# Patient Record
Sex: FEMALE | Race: WHITE | NOT HISPANIC OR LATINO | Employment: UNEMPLOYED
[De-identification: names, ages, dates, MRNs, and addresses within clinical notes are randomized per-mention and may not be internally consistent; named-entity substitution may affect disease eponyms.]

---

## 2017-10-01 ENCOUNTER — SURGERY (OUTPATIENT)
Age: 72
End: 2017-10-01

## 2017-10-01 ENCOUNTER — HOSPITAL ENCOUNTER (OUTPATIENT)
Facility: CLINIC | Age: 72
Setting detail: OBSERVATION
Discharge: HOME | End: 2017-10-01
Attending: EMERGENCY MEDICINE | Admitting: INTERNAL MEDICINE
Payer: COMMERCIAL

## 2017-10-01 ENCOUNTER — ANESTHESIA EVENT (OUTPATIENT)
Dept: SURGERY | Facility: CLINIC | Age: 72
End: 2017-10-01
Payer: COMMERCIAL

## 2017-10-01 ENCOUNTER — APPOINTMENT (OUTPATIENT)
Dept: GENERAL RADIOLOGY | Facility: CLINIC | Age: 72
End: 2017-10-01
Attending: INTERNAL MEDICINE
Payer: COMMERCIAL

## 2017-10-01 ENCOUNTER — APPOINTMENT (OUTPATIENT)
Dept: CT IMAGING | Facility: CLINIC | Age: 72
End: 2017-10-01
Attending: EMERGENCY MEDICINE
Payer: COMMERCIAL

## 2017-10-01 ENCOUNTER — ANESTHESIA (OUTPATIENT)
Dept: SURGERY | Facility: CLINIC | Age: 72
End: 2017-10-01
Payer: COMMERCIAL

## 2017-10-01 VITALS
WEIGHT: 154.32 LBS | TEMPERATURE: 97.3 F | HEIGHT: 61 IN | DIASTOLIC BLOOD PRESSURE: 42 MMHG | OXYGEN SATURATION: 96 % | BODY MASS INDEX: 29.14 KG/M2 | RESPIRATION RATE: 19 BRPM | SYSTOLIC BLOOD PRESSURE: 110 MMHG | HEART RATE: 47 BPM

## 2017-10-01 DIAGNOSIS — N20.0 KIDNEY STONE: ICD-10-CM

## 2017-10-01 LAB
ALBUMIN SERPL-MCNC: 4 G/DL (ref 3.4–5)
ALBUMIN UR-MCNC: NEGATIVE MG/DL
ALP SERPL-CCNC: 66 U/L (ref 40–150)
ALT SERPL W P-5'-P-CCNC: 31 U/L (ref 0–50)
ANION GAP SERPL CALCULATED.3IONS-SCNC: 7 MMOL/L (ref 3–14)
APPEARANCE UR: ABNORMAL
AST SERPL W P-5'-P-CCNC: 22 U/L (ref 0–45)
BASOPHILS # BLD AUTO: 0 10E9/L (ref 0–0.2)
BASOPHILS NFR BLD AUTO: 0.2 %
BILIRUB SERPL-MCNC: 0.8 MG/DL (ref 0.2–1.3)
BILIRUB UR QL STRIP: NEGATIVE
BUN SERPL-MCNC: 24 MG/DL (ref 7–30)
CALCIUM SERPL-MCNC: 9.3 MG/DL (ref 8.5–10.1)
CHLORIDE SERPL-SCNC: 108 MMOL/L (ref 94–109)
CO2 SERPL-SCNC: 26 MMOL/L (ref 20–32)
COLOR UR AUTO: YELLOW
CREAT SERPL-MCNC: 1 MG/DL (ref 0.52–1.04)
DIFFERENTIAL METHOD BLD: ABNORMAL
EOSINOPHIL # BLD AUTO: 0 10E9/L (ref 0–0.7)
EOSINOPHIL NFR BLD AUTO: 0.1 %
ERYTHROCYTE [DISTWIDTH] IN BLOOD BY AUTOMATED COUNT: 13.3 % (ref 10–15)
GFR SERPL CREATININE-BSD FRML MDRD: 55 ML/MIN/1.7M2
GLUCOSE SERPL-MCNC: 121 MG/DL (ref 70–99)
GLUCOSE UR STRIP-MCNC: NEGATIVE MG/DL
HCT VFR BLD AUTO: 39.7 % (ref 35–47)
HGB BLD-MCNC: 13.7 G/DL (ref 11.7–15.7)
HGB UR QL STRIP: ABNORMAL
IMM GRANULOCYTES # BLD: 0 10E9/L (ref 0–0.4)
IMM GRANULOCYTES NFR BLD: 0.4 %
KETONES UR STRIP-MCNC: 20 MG/DL
LEUKOCYTE ESTERASE UR QL STRIP: NEGATIVE
LIPASE SERPL-CCNC: 119 U/L (ref 73–393)
LYMPHOCYTES # BLD AUTO: 0.6 10E9/L (ref 0.8–5.3)
LYMPHOCYTES NFR BLD AUTO: 7.4 %
MCH RBC QN AUTO: 32.2 PG (ref 26.5–33)
MCHC RBC AUTO-ENTMCNC: 34.5 G/DL (ref 31.5–36.5)
MCV RBC AUTO: 93 FL (ref 78–100)
MONOCYTES # BLD AUTO: 0.4 10E9/L (ref 0–1.3)
MONOCYTES NFR BLD AUTO: 5 %
MUCOUS THREADS #/AREA URNS LPF: PRESENT /LPF
NEUTROPHILS # BLD AUTO: 7.1 10E9/L (ref 1.6–8.3)
NEUTROPHILS NFR BLD AUTO: 86.9 %
NITRATE UR QL: NEGATIVE
NRBC # BLD AUTO: 0 10*3/UL
NRBC BLD AUTO-RTO: 0 /100
PH UR STRIP: 5 PH (ref 5–7)
PLATELET # BLD AUTO: 288 10E9/L (ref 150–450)
POTASSIUM SERPL-SCNC: 3.8 MMOL/L (ref 3.4–5.3)
PROT SERPL-MCNC: 7.6 G/DL (ref 6.8–8.8)
RBC # BLD AUTO: 4.26 10E12/L (ref 3.8–5.2)
RBC #/AREA URNS AUTO: 17 /HPF (ref 0–2)
SODIUM SERPL-SCNC: 141 MMOL/L (ref 133–144)
SOURCE: ABNORMAL
SP GR UR STRIP: 1.02 (ref 1–1.03)
SQUAMOUS #/AREA URNS AUTO: <1 /HPF (ref 0–1)
TROPONIN I SERPL-MCNC: <0.015 UG/L (ref 0–0.04)
UROBILINOGEN UR STRIP-MCNC: 0 MG/DL (ref 0–2)
WBC # BLD AUTO: 8.2 10E9/L (ref 4–11)
WBC #/AREA URNS AUTO: 3 /HPF (ref 0–2)

## 2017-10-01 PROCEDURE — 74177 CT ABD & PELVIS W/CONTRAST: CPT

## 2017-10-01 PROCEDURE — 25000125 ZZHC RX 250: Performed by: NURSE ANESTHETIST, CERTIFIED REGISTERED

## 2017-10-01 PROCEDURE — C1758 CATHETER, URETERAL: HCPCS | Performed by: UROLOGY

## 2017-10-01 PROCEDURE — 40000277 XR SURGERY CARM FLUORO LESS THAN 5 MIN W STILLS: Mod: TC

## 2017-10-01 PROCEDURE — 96376 TX/PRO/DX INJ SAME DRUG ADON: CPT

## 2017-10-01 PROCEDURE — 81001 URINALYSIS AUTO W/SCOPE: CPT | Performed by: EMERGENCY MEDICINE

## 2017-10-01 PROCEDURE — 37000009 ZZH ANESTHESIA TECHNICAL FEE, EACH ADDTL 15 MIN: Performed by: UROLOGY

## 2017-10-01 PROCEDURE — 93005 ELECTROCARDIOGRAM TRACING: CPT

## 2017-10-01 PROCEDURE — 40000065 ZZH STATISTIC EKG NON-CHARGEABLE

## 2017-10-01 PROCEDURE — 25000128 H RX IP 250 OP 636: Performed by: UROLOGY

## 2017-10-01 PROCEDURE — 36000058 ZZH SURGERY LEVEL 3 EA 15 ADDTL MIN: Performed by: UROLOGY

## 2017-10-01 PROCEDURE — 96374 THER/PROPH/DIAG INJ IV PUSH: CPT | Mod: 59

## 2017-10-01 PROCEDURE — 25000566 ZZH SEVOFLURANE, EA 15 MIN: Performed by: UROLOGY

## 2017-10-01 PROCEDURE — 27210794 ZZH OR GENERAL SUPPLY STERILE: Performed by: UROLOGY

## 2017-10-01 PROCEDURE — 99222 1ST HOSP IP/OBS MODERATE 55: CPT | Mod: 25 | Performed by: UROLOGY

## 2017-10-01 PROCEDURE — 25000128 H RX IP 250 OP 636: Performed by: EMERGENCY MEDICINE

## 2017-10-01 PROCEDURE — 96361 HYDRATE IV INFUSION ADD-ON: CPT

## 2017-10-01 PROCEDURE — 71000012 ZZH RECOVERY PHASE 1 LEVEL 1 FIRST HR: Performed by: UROLOGY

## 2017-10-01 PROCEDURE — 71000027 ZZH RECOVERY PHASE 2 EACH 15 MINS: Performed by: UROLOGY

## 2017-10-01 PROCEDURE — 96375 TX/PRO/DX INJ NEW DRUG ADDON: CPT

## 2017-10-01 PROCEDURE — 37000008 ZZH ANESTHESIA TECHNICAL FEE, 1ST 30 MIN: Performed by: UROLOGY

## 2017-10-01 PROCEDURE — 52356 CYSTO/URETERO W/LITHOTRIPSY: CPT | Performed by: UROLOGY

## 2017-10-01 PROCEDURE — C1769 GUIDE WIRE: HCPCS | Performed by: UROLOGY

## 2017-10-01 PROCEDURE — 40000306 ZZH STATISTIC PRE PROC ASSESS II: Performed by: UROLOGY

## 2017-10-01 PROCEDURE — 99285 EMERGENCY DEPT VISIT HI MDM: CPT | Mod: 25

## 2017-10-01 PROCEDURE — C2617 STENT, NON-COR, TEM W/O DEL: HCPCS | Performed by: UROLOGY

## 2017-10-01 PROCEDURE — 99220 ZZC INITIAL OBSERVATION CARE,LEVL III: CPT | Performed by: INTERNAL MEDICINE

## 2017-10-01 PROCEDURE — 83690 ASSAY OF LIPASE: CPT | Performed by: EMERGENCY MEDICINE

## 2017-10-01 PROCEDURE — 25000128 H RX IP 250 OP 636: Performed by: INTERNAL MEDICINE

## 2017-10-01 PROCEDURE — 36000060 ZZH SURGERY LEVEL 3 W FLUORO 1ST 30 MIN: Performed by: UROLOGY

## 2017-10-01 PROCEDURE — 25000132 ZZH RX MED GY IP 250 OP 250 PS 637: Performed by: EMERGENCY MEDICINE

## 2017-10-01 PROCEDURE — 82365 CALCULUS SPECTROSCOPY: CPT | Performed by: UROLOGY

## 2017-10-01 PROCEDURE — 88300 SURGICAL PATH GROSS: CPT | Performed by: UROLOGY

## 2017-10-01 PROCEDURE — 88300 SURGICAL PATH GROSS: CPT | Mod: 26 | Performed by: UROLOGY

## 2017-10-01 PROCEDURE — 80053 COMPREHEN METABOLIC PANEL: CPT | Performed by: EMERGENCY MEDICINE

## 2017-10-01 PROCEDURE — 85025 COMPLETE CBC W/AUTO DIFF WBC: CPT | Performed by: EMERGENCY MEDICINE

## 2017-10-01 PROCEDURE — 25000128 H RX IP 250 OP 636: Performed by: NURSE ANESTHETIST, CERTIFIED REGISTERED

## 2017-10-01 PROCEDURE — 25800025 ZZH RX 258: Performed by: UROLOGY

## 2017-10-01 PROCEDURE — 84484 ASSAY OF TROPONIN QUANT: CPT | Performed by: EMERGENCY MEDICINE

## 2017-10-01 DEVICE — STENT URETERAL DBL PIGTAIL INLAY 6FRX24CM 778624: Type: IMPLANTABLE DEVICE | Site: URETER | Status: FUNCTIONAL

## 2017-10-01 RX ORDER — LABETALOL HYDROCHLORIDE 5 MG/ML
10 INJECTION, SOLUTION INTRAVENOUS
Status: DISCONTINUED | OUTPATIENT
Start: 2017-10-01 | End: 2017-10-01 | Stop reason: HOSPADM

## 2017-10-01 RX ORDER — ALBUTEROL SULFATE 0.83 MG/ML
2.5 SOLUTION RESPIRATORY (INHALATION) EVERY 4 HOURS PRN
Status: DISCONTINUED | OUTPATIENT
Start: 2017-10-01 | End: 2017-10-01 | Stop reason: HOSPADM

## 2017-10-01 RX ORDER — DEXAMETHASONE SODIUM PHOSPHATE 4 MG/ML
INJECTION, SOLUTION INTRA-ARTICULAR; INTRALESIONAL; INTRAMUSCULAR; INTRAVENOUS; SOFT TISSUE PRN
Status: DISCONTINUED | OUTPATIENT
Start: 2017-10-01 | End: 2017-10-01

## 2017-10-01 RX ORDER — HYDROMORPHONE HYDROCHLORIDE 1 MG/ML
0.5 INJECTION, SOLUTION INTRAMUSCULAR; INTRAVENOUS; SUBCUTANEOUS
Status: DISCONTINUED | OUTPATIENT
Start: 2017-10-01 | End: 2017-10-01

## 2017-10-01 RX ORDER — KETOROLAC TROMETHAMINE 15 MG/ML
15 INJECTION, SOLUTION INTRAMUSCULAR; INTRAVENOUS ONCE
Status: COMPLETED | OUTPATIENT
Start: 2017-10-01 | End: 2017-10-01

## 2017-10-01 RX ORDER — HYDROMORPHONE HYDROCHLORIDE 1 MG/ML
.3-.5 INJECTION, SOLUTION INTRAMUSCULAR; INTRAVENOUS; SUBCUTANEOUS
Status: DISCONTINUED | OUTPATIENT
Start: 2017-10-01 | End: 2017-10-01 | Stop reason: HOSPADM

## 2017-10-01 RX ORDER — DIAZEPAM 10 MG/2ML
2.5 INJECTION, SOLUTION INTRAMUSCULAR; INTRAVENOUS
Status: DISCONTINUED | OUTPATIENT
Start: 2017-10-01 | End: 2017-10-01 | Stop reason: HOSPADM

## 2017-10-01 RX ORDER — HYDROCODONE BITARTRATE AND ACETAMINOPHEN 5; 325 MG/1; MG/1
1 TABLET ORAL ONCE
Status: COMPLETED | OUTPATIENT
Start: 2017-10-01 | End: 2017-10-01

## 2017-10-01 RX ORDER — GLYCOPYRROLATE 0.2 MG/ML
INJECTION, SOLUTION INTRAMUSCULAR; INTRAVENOUS PRN
Status: DISCONTINUED | OUTPATIENT
Start: 2017-10-01 | End: 2017-10-01

## 2017-10-01 RX ORDER — ONDANSETRON 4 MG/1
4 TABLET, ORALLY DISINTEGRATING ORAL EVERY 30 MIN PRN
Status: DISCONTINUED | OUTPATIENT
Start: 2017-10-01 | End: 2017-10-01 | Stop reason: HOSPADM

## 2017-10-01 RX ORDER — SODIUM CHLORIDE AND POTASSIUM CHLORIDE 150; 900 MG/100ML; MG/100ML
INJECTION, SOLUTION INTRAVENOUS CONTINUOUS
Status: DISCONTINUED | OUTPATIENT
Start: 2017-10-01 | End: 2017-10-01 | Stop reason: HOSPADM

## 2017-10-01 RX ORDER — SODIUM CHLORIDE, SODIUM LACTATE, POTASSIUM CHLORIDE, CALCIUM CHLORIDE 600; 310; 30; 20 MG/100ML; MG/100ML; MG/100ML; MG/100ML
INJECTION, SOLUTION INTRAVENOUS CONTINUOUS
Status: DISCONTINUED | OUTPATIENT
Start: 2017-10-01 | End: 2017-10-01 | Stop reason: HOSPADM

## 2017-10-01 RX ORDER — FENTANYL CITRATE 50 UG/ML
25-50 INJECTION, SOLUTION INTRAMUSCULAR; INTRAVENOUS
Status: DISCONTINUED | OUTPATIENT
Start: 2017-10-01 | End: 2017-10-01 | Stop reason: HOSPADM

## 2017-10-01 RX ORDER — LIDOCAINE HYDROCHLORIDE 10 MG/ML
INJECTION, SOLUTION INFILTRATION; PERINEURAL PRN
Status: DISCONTINUED | OUTPATIENT
Start: 2017-10-01 | End: 2017-10-01

## 2017-10-01 RX ORDER — HYDROCODONE BITARTRATE AND ACETAMINOPHEN 5; 325 MG/1; MG/1
1 TABLET ORAL EVERY 6 HOURS PRN
Qty: 8 TABLET | Refills: 0 | Status: SHIPPED | OUTPATIENT
Start: 2017-10-01 | End: 2017-10-10

## 2017-10-01 RX ORDER — FENTANYL CITRATE 50 UG/ML
INJECTION, SOLUTION INTRAMUSCULAR; INTRAVENOUS PRN
Status: DISCONTINUED | OUTPATIENT
Start: 2017-10-01 | End: 2017-10-01

## 2017-10-01 RX ORDER — ONDANSETRON 2 MG/ML
4 INJECTION INTRAMUSCULAR; INTRAVENOUS EVERY 6 HOURS PRN
Status: DISCONTINUED | OUTPATIENT
Start: 2017-10-01 | End: 2017-10-01 | Stop reason: HOSPADM

## 2017-10-01 RX ORDER — ASPIRIN 81 MG/1
81 TABLET, CHEWABLE ORAL DAILY
COMMUNITY

## 2017-10-01 RX ORDER — ACETAMINOPHEN 10 MG/ML
1000 INJECTION, SOLUTION INTRAVENOUS ONCE
Status: DISCONTINUED | OUTPATIENT
Start: 2017-10-01 | End: 2017-10-01 | Stop reason: HOSPADM

## 2017-10-01 RX ORDER — TAMSULOSIN HYDROCHLORIDE 0.4 MG/1
0.4 CAPSULE ORAL ONCE
Status: COMPLETED | OUTPATIENT
Start: 2017-10-01 | End: 2017-10-01

## 2017-10-01 RX ORDER — METOCLOPRAMIDE HYDROCHLORIDE 5 MG/ML
5 INJECTION INTRAMUSCULAR; INTRAVENOUS ONCE
Status: COMPLETED | OUTPATIENT
Start: 2017-10-01 | End: 2017-10-01

## 2017-10-01 RX ORDER — SODIUM CHLORIDE 9 MG/ML
1000 INJECTION, SOLUTION INTRAVENOUS CONTINUOUS
Status: DISCONTINUED | OUTPATIENT
Start: 2017-10-01 | End: 2017-10-01

## 2017-10-01 RX ORDER — KETOROLAC TROMETHAMINE 30 MG/ML
INJECTION, SOLUTION INTRAMUSCULAR; INTRAVENOUS PRN
Status: DISCONTINUED | OUTPATIENT
Start: 2017-10-01 | End: 2017-10-01

## 2017-10-01 RX ORDER — TAMSULOSIN HYDROCHLORIDE 0.4 MG/1
0.4 CAPSULE ORAL DAILY
Status: DISCONTINUED | OUTPATIENT
Start: 2017-10-02 | End: 2017-10-01 | Stop reason: HOSPADM

## 2017-10-01 RX ORDER — ONDANSETRON 2 MG/ML
4 INJECTION INTRAMUSCULAR; INTRAVENOUS EVERY 30 MIN PRN
Status: DISCONTINUED | OUTPATIENT
Start: 2017-10-01 | End: 2017-10-01 | Stop reason: HOSPADM

## 2017-10-01 RX ORDER — DIMENHYDRINATE 50 MG/ML
25 INJECTION, SOLUTION INTRAMUSCULAR; INTRAVENOUS
Status: DISCONTINUED | OUTPATIENT
Start: 2017-10-01 | End: 2017-10-01 | Stop reason: HOSPADM

## 2017-10-01 RX ORDER — IOPAMIDOL 755 MG/ML
500 INJECTION, SOLUTION INTRAVASCULAR ONCE
Status: COMPLETED | OUTPATIENT
Start: 2017-10-01 | End: 2017-10-01

## 2017-10-01 RX ORDER — CEFAZOLIN SODIUM 2 G/100ML
2 INJECTION, SOLUTION INTRAVENOUS
Status: COMPLETED | OUTPATIENT
Start: 2017-10-01 | End: 2017-10-01

## 2017-10-01 RX ORDER — NALOXONE HYDROCHLORIDE 0.4 MG/ML
.1-.4 INJECTION, SOLUTION INTRAMUSCULAR; INTRAVENOUS; SUBCUTANEOUS
Status: DISCONTINUED | OUTPATIENT
Start: 2017-10-01 | End: 2017-10-01 | Stop reason: HOSPADM

## 2017-10-01 RX ORDER — CIPROFLOXACIN 250 MG/1
250 TABLET, FILM COATED ORAL 2 TIMES DAILY
Qty: 4 TABLET | Refills: 0 | Status: SHIPPED | OUTPATIENT
Start: 2017-10-01 | End: 2017-10-03

## 2017-10-01 RX ORDER — MULTIPLE VITAMINS W/ MINERALS TAB 9MG-400MCG
1 TAB ORAL DAILY
Status: ON HOLD | COMMUNITY
End: 2017-10-01

## 2017-10-01 RX ORDER — MEPERIDINE HYDROCHLORIDE 25 MG/ML
12.5 INJECTION INTRAMUSCULAR; INTRAVENOUS; SUBCUTANEOUS EVERY 5 MIN PRN
Status: DISCONTINUED | OUTPATIENT
Start: 2017-10-01 | End: 2017-10-01 | Stop reason: HOSPADM

## 2017-10-01 RX ORDER — DROPERIDOL 2.5 MG/ML
0.62 INJECTION, SOLUTION INTRAMUSCULAR; INTRAVENOUS
Status: DISCONTINUED | OUTPATIENT
Start: 2017-10-01 | End: 2017-10-01 | Stop reason: RX

## 2017-10-01 RX ORDER — PROPOFOL 10 MG/ML
INJECTION, EMULSION INTRAVENOUS PRN
Status: DISCONTINUED | OUTPATIENT
Start: 2017-10-01 | End: 2017-10-01

## 2017-10-01 RX ORDER — ONDANSETRON 2 MG/ML
4 INJECTION INTRAMUSCULAR; INTRAVENOUS EVERY 30 MIN PRN
Status: DISCONTINUED | OUTPATIENT
Start: 2017-10-01 | End: 2017-10-01

## 2017-10-01 RX ORDER — SODIUM CHLORIDE, SODIUM LACTATE, POTASSIUM CHLORIDE, CALCIUM CHLORIDE 600; 310; 30; 20 MG/100ML; MG/100ML; MG/100ML; MG/100ML
INJECTION, SOLUTION INTRAVENOUS CONTINUOUS PRN
Status: DISCONTINUED | OUTPATIENT
Start: 2017-10-01 | End: 2017-10-01

## 2017-10-01 RX ADMIN — SODIUM CHLORIDE, POTASSIUM CHLORIDE, SODIUM LACTATE AND CALCIUM CHLORIDE: 600; 310; 30; 20 INJECTION, SOLUTION INTRAVENOUS at 16:28

## 2017-10-01 RX ADMIN — WATER 300 ML: 100 INJECTION, SOLUTION INTRAVENOUS at 17:05

## 2017-10-01 RX ADMIN — GLYCOPYRROLATE 0.3 MG: 0.2 INJECTION, SOLUTION INTRAMUSCULAR; INTRAVENOUS at 16:34

## 2017-10-01 RX ADMIN — HYDROCODONE BITARTRATE AND ACETAMINOPHEN 1 TABLET: 5; 325 TABLET ORAL at 09:45

## 2017-10-01 RX ADMIN — SODIUM CHLORIDE 59 ML: 9 INJECTION, SOLUTION INTRAVENOUS at 09:11

## 2017-10-01 RX ADMIN — PROPOFOL 150 MG: 10 INJECTION, EMULSION INTRAVENOUS at 16:34

## 2017-10-01 RX ADMIN — Medication 0.5 MG: at 09:25

## 2017-10-01 RX ADMIN — KETOROLAC TROMETHAMINE 15 MG: 30 INJECTION, SOLUTION INTRAMUSCULAR at 16:34

## 2017-10-01 RX ADMIN — FENTANYL CITRATE 100 MCG: 50 INJECTION, SOLUTION INTRAMUSCULAR; INTRAVENOUS at 16:34

## 2017-10-01 RX ADMIN — DEXAMETHASONE SODIUM PHOSPHATE 4 MG: 4 INJECTION, SOLUTION INTRA-ARTICULAR; INTRALESIONAL; INTRAMUSCULAR; INTRAVENOUS; SOFT TISSUE at 16:34

## 2017-10-01 RX ADMIN — CEFAZOLIN SODIUM 2 G: 2 INJECTION, SOLUTION INTRAVENOUS at 16:28

## 2017-10-01 RX ADMIN — ONDANSETRON 4 MG: 2 INJECTION INTRAMUSCULAR; INTRAVENOUS at 16:34

## 2017-10-01 RX ADMIN — KETOROLAC TROMETHAMINE 15 MG: 15 INJECTION, SOLUTION INTRAMUSCULAR; INTRAVENOUS at 09:45

## 2017-10-01 RX ADMIN — LIDOCAINE HYDROCHLORIDE 30 MG: 10 INJECTION, SOLUTION INFILTRATION; PERINEURAL at 16:34

## 2017-10-01 RX ADMIN — Medication 0.5 MG: at 07:47

## 2017-10-01 RX ADMIN — ONDANSETRON 4 MG: 2 INJECTION INTRAMUSCULAR; INTRAVENOUS at 08:57

## 2017-10-01 RX ADMIN — SODIUM CHLORIDE 1000 ML: 9 INJECTION, SOLUTION INTRAVENOUS at 07:48

## 2017-10-01 RX ADMIN — IOPAMIDOL 75 ML: 755 INJECTION, SOLUTION INTRAVENOUS at 09:11

## 2017-10-01 RX ADMIN — TAMSULOSIN HYDROCHLORIDE 0.4 MG: 0.4 CAPSULE ORAL at 10:47

## 2017-10-01 RX ADMIN — POTASSIUM CHLORIDE AND SODIUM CHLORIDE: 900; 150 INJECTION, SOLUTION INTRAVENOUS at 12:30

## 2017-10-01 RX ADMIN — SODIUM CHLORIDE 1000 ML: 9 INJECTION, SOLUTION INTRAVENOUS at 08:58

## 2017-10-01 RX ADMIN — METOCLOPRAMIDE 5 MG: 5 INJECTION, SOLUTION INTRAMUSCULAR; INTRAVENOUS at 10:27

## 2017-10-01 ASSESSMENT — PAIN DESCRIPTION - DESCRIPTORS: DESCRIPTORS: ACHING;DISCOMFORT

## 2017-10-01 ASSESSMENT — ENCOUNTER SYMPTOMS
DIARRHEA: 0
VOMITING: 1
ABDOMINAL PAIN: 1
NAUSEA: 1
FEVER: 0

## 2017-10-01 NOTE — ED NOTES
[facility]  ED Nurse Handoff Report    Marilee Bell is a 72 year old female   ED Chief complaint: No chief complaint on file.  . ED Diagnosis:   Final diagnoses:   Kidney stone     Allergies:   Allergies   Allergen Reactions     Sulfa Drugs        Code Status: Full Code  Activity level - Baseline/Home:  Independent. Activity Level - Current:   Stand with Assist. Lift room needed: No. Bariatric: No   Needed: No   Isolation: No. Infection: Not Applicable.     Vital Signs:   Vitals:    10/01/17 0930 10/01/17 0945 10/01/17 1000 10/01/17 1015   BP: 136/74 118/54 99/50 122/53   Resp:       Temp:       TempSrc:       SpO2: 95% 90% 91% 90%   Height:           Cardiac Rhythm:  ,      Pain level: 0-10 Pain Scale: 6  Patient confused: No. Patient Falls Risk: Yes.   Elimination Status: Has voided   Patient Report - Initial Complaint: L abd/flank pain, N/V.  72 year old female with history of diverticulitis, presenting with left sided abdominal pain.  DDx includes diverticulitis, renal stone, ACS, gastroenteritis, colitis, mesenteric ischemia, pneumonia.  Patient has 5 mm left sided obstructing ureteral stone with mild hydronephrosis seen on CT A/P.  Patient does not have evidence of infected stone.  She has had multiple episodes of vomiting in the ED, despite IV medications.  I am concerned she will have poor pain control at home if she is unable to keep down PO medications. She prefers to be admitted under observation for IVF, pain/nausea medications.  Urologist will consult and plan discussed with hospitalist.       . Focused Assessment: Continued N/V & Pain  Tests Performed: Labs, US, UA  . Abnormal Results:   Labs Ordered and Resulted from Time of ED Arrival Up to the Time of Departure from the ED   CBC WITH PLATELETS DIFFERENTIAL - Abnormal; Notable for the following:        Result Value    Absolute Lymphocytes 0.6 (*)     All other components within normal limits   COMPREHENSIVE METABOLIC PANEL  - Abnormal; Notable for the following:     Glucose 121 (*)     GFR Estimate 55 (*)     All other components within normal limits   URINE MACROSCOPIC WITH REFLEX TO MICRO - Abnormal; Notable for the following:     Ketones Urine 20 (*)     Blood Urine Small (*)     RBC Urine 17 (*)     WBC Urine 3 (*)     Mucous Urine Present (*)     All other components within normal limits   TROPONIN I   LIPASE   .   Treatments provided: IVF, pain and nausea meds  Family Comments:  at bedside  OBS brochure/video discussed/provided to patient:  Yes  ED Medications:   Medications   HYDROmorphone (PF) (DILAUDID) injection 0.5 mg (0.5 mg Intravenous Given 10/1/17 0925)   ondansetron (ZOFRAN) injection 4 mg (4 mg Intravenous Given 10/1/17 0857)   0.9% sodium chloride BOLUS (0 mLs Intravenous Stopped 10/1/17 0900)     Followed by   0.9% sodium chloride infusion (1,000 mLs Intravenous New Bag 10/1/17 0858)   iopamidol (ISOVUE-370) solution 500 mL (75 mLs Intravenous Given 10/1/17 0911)   0.9% sodium chloride BOLUS (0 mLs Intravenous Stopped 10/1/17 0914)   ketorolac (TORADOL) injection 15 mg (15 mg Intravenous Given 10/1/17 0945)   HYDROcodone-acetaminophen (NORCO) 5-325 MG per tablet 1 tablet (1 tablet Oral Given 10/1/17 0945)   metoclopramide (REGLAN) injection 5 mg (5 mg Intravenous Given 10/1/17 1027)   tamsulosin (FLOMAX) capsule 0.4 mg (0.4 mg Oral Given 10/1/17 1047)     Drips infusing:  No  For the majority of the shift, the patient's behavior Green. Interventions performed were NA.     Severe Sepsis OR Septic Shock Diagnosis Present: No      ED Nurse Name/Phone Number: Maria Del Carmen Raymundo,   11:24 AM    RECEIVING UNIT ED HANDOFF REVIEW    Above ED Nurse Handoff Report was reviewed: Yes  Reviewed by: Ronna Ward on October 1, 2017 at 11:35 AM

## 2017-10-01 NOTE — PLAN OF CARE
Problem: Patient Care Overview  Goal: Plan of Care/Patient Progress Review  ROOM # 206-1     Living Situation (if not independent, order SW consult): lives with  in house  Facility name: N/A  : [facility] ()     Activity level at baseline: Independent - cane prn  Activity level on admit: SBA        Patient registered to observation; given Patient Bill of Rights; given the opportunity to ask questions about observation status and their plan of care.  Patient has been oriented to the observation room, bathroom and call light is in place.     Discussed discharge goals and expectations with patient/family.

## 2017-10-01 NOTE — PHARMACY-ADMISSION MEDICATION HISTORY
Admission medication history interview status for this patient is complete. See [facility] admission navigator for allergy information, prior to admission medications and immunization status.     Medication history interview source(s): Spouse  Medication history resources (including written lists, pill bottles, clinic record):None  Primary pharmacy:[facility]    Changes made to PTA medication list:  Added: none  Deleted: none  Changed: none    Actions taken by pharmacist (provider contacted, etc):None     Additional medication history information:None    Medication reconciliation/reorder completed by provider prior to medication history? No    Do you take OTC medications (eg tylenol, ibuprofen, fish oil, eye/ear drops, etc)? No    For patients on insulin therapy: No    Prior to Admission medications    Medication Sig Last Dose Taking? Auth Provider   aspirin 81 MG chewable tablet Take 81 mg by mouth daily 9/30/2017 at Unknown time Yes Reported, Patient   multivitamin, therapeutic with minerals (MULTI-VITAMIN) TABS tablet Take 1 tablet by mouth daily 9/30/2017 at Unknown time Yes Reported, Patient   SIMVASTATIN PO Take 20 mg by mouth At Bedtime 9/30/2017 at Unknown time Yes Reported, Patient

## 2017-10-01 NOTE — DISCHARGE INSTRUCTIONS
CYSTOSCOPY DISCHARGE INSTRUCTIONS  [facility] / UROLOGY  [facility]  [phone]    YOU MAY GO BACK TO YOUR NORMAL DIET AND ACTIVITY, UNLESS YOUR DOCTOR TELLS YOU NOT TO.    FOR THE NEXT TWO DAYS, YOU MAY NOTICE:    SOME BLOOD IN YOUR URINE.  SOME BURNING WHEN YOU URINATE (USE THE TOILET).  AN URGE TO URINATE MORE OFTEN.  BLADDER SPASMS.    THESE ARE NORMAL AFTER THE PROCEDURE.  THEY SHOULD GO AWAY AFTER A DAY OR TWO.  TO RELIEVE THESE PROBLEMS:     DRINK 6 TO 8 LARGE GLASSES OF WATER EACH DAY (INCLUDES DRINKS AT MEALS).  THIS WILL HELP CLEAR THE URINE.    TAKE WARM BATHS TO RELIEVE PAIN AND BLADDER SPASMS.  DO NOT ADD ANYTHING TO THE BATH WATER.    YOUR DOCTOR MAY PRESCRIBE PAIN MEDICINE.  YOU MAY ALSO TAKE TYLENOL (ACETAMINOPHEN) FOR PAIN.    CALL YOUR SURGEON IF YOU HAVE:    A FEVER OVER 101 DEGREES.  CHECK YOUR TEMPERATURE UNDER YOUR TONGUE.    CHILLS.    FAILURE TO URINATE (NO URINE COMES OUT WHEN YOU TRY TO USE THE TOILET).  TRY SOAKING IN A BATHTUB FULL OF WARM WATER.  IF STILL NO URINE, CALL YOUR DOCTOR.    A LOT OF BLOOD IN THE URINE, OR BLOOD CLOTS LARGER THAN A NICKEL.      PAIN IN THE BACK OR BELLY AREA (ABDOMEN).    PAIN OR SPASMS THAT ARE NOT RELIEVED BY WARM TUB BATHS AND PAIN MEDICINE.      SEVERE PAIN, BURNING OR OTHER PROBLEMS WHILE PASSING URINE.    PAIN THAT GETS WORSE AFTER TWO DAYS.        STENT INFORMATION/DISCHARGE INSTRUCTIONS  [facility] / UROLOGY  [facility]  [phone]    During surgery, a stent may be placed in the ureter.  The ureter is the tube that drains urine from the kidney to the bladder.  The stent is placed to dilate (open) the ureter so stone fragments can pass easily through the ureter or to decrease ureteral swelling after surgery or to relieve an obstruction.      The stent is made of silicone.  The upper end of the stent curls in the kidney while the lower end rests in the bladder.    While the stent is in  place you may experience the following symptoms:  Blood and/or small blood clots in the urine  Bladder spasms (frequency and urgency of urination)  Discomfort or aching in the back or side where the stent is  Burning or discomfort at the end of urine stream    To decrease these symptoms you should:  Take antispasmodic medication as prescribed (Detrol, Ditropan, etc.)  Drink plenty of fluids but avoid caffeine and citrus (include cranberry)  If you are having discomfort in back or side, decrease activity    Please call your physician or the physician on call if you experience:  Fever greater than 101 degrees  Severe pain not relieved by pain medication or rest    Please make an appointment for the removal of the stent according to your physician's instructions.      GENERAL ANESTHESIA OR SEDATION ADULT DISCHARGE INSTRUCTIONS   SPECIAL PRECAUTIONS FOR 24 HOURS AFTER SURGERY    IT IS NOT UNUSUAL TO FEEL LIGHT-HEADED OR FAINT, UP TO 24 HOURS AFTER SURGERY OR WHILE TAKING PAIN MEDICATION.  IF YOU HAVE THESE SYMPTOMS; SIT FOR A FEW MINUTES BEFORE STANDING AND HAVE SOMEONE ASSIST YOU WHEN YOU GET UP TO WALK OR USE THE BATHROOM.    YOU SHOULD REST AND RELAX FOR THE NEXT 24 HOURS AND YOU MUST MAKE ARRANGEMENTS TO HAVE SOMEONE STAY WITH YOU FOR AT LEAST 24 HOURS AFTER YOUR DISCHARGE.  AVOID HAZARDOUS AND STRENUOUS ACTIVITIES.  DO NOT MAKE IMPORTANT DECISIONS FOR 24 HOURS.    DO NOT DRIVE ANY VEHICLE OR OPERATE MECHANICAL EQUIPMENT FOR 24 HOURS FOLLOWING THE END OF YOUR SURGERY.  EVEN THOUGH YOU MAY FEEL NORMAL, YOUR REACTIONS MAY BE AFFECTED BY THE MEDICATION YOU HAVE RECEIVED.    DO NOT DRINK ALCOHOLIC BEVERAGES FOR 24 HOURS FOLLOWING YOUR SURGERY.    DRINK CLEAR LIQUIDS (APPLE JUICE, GINGER ALE, 7-UP, BROTH, ETC.).  PROGRESS TO YOUR REGULAR DIET AS YOU FEEL ABLE.    YOU MAY HAVE A DRY MOUTH, A SORE THROAT, MUSCLES ACHES OR TROUBLE SLEEPING.  THESE SHOULD GO AWAY AFTER 24 HOURS.    CALL YOUR DOCTOR FOR ANY OF THE  FOLLOWING:  SIGNS OF INFECTION (FEVER, GROWING TENDERNESS AT THE SURGERY SITE, A LARGE AMOUNT OF DRAINAGE OR BLEEDING, SEVERE PAIN, FOUL-SMELLING DRAINAGE, REDNESS OR SWELLING.    IT HAS BEEN OVER 8 TO 10 HOURS SINCE SURGERY AND YOU ARE STILL NOT ABLE TO URINATE (PASS WATER).

## 2017-10-01 NOTE — DISCHARGE SUMMARY
[facility]    Discharge Summary  Hospitalist    Date of Admission:  10/1/2017  Date of Discharge:  10/1/2017  6:42 PM  Provider:  Mary Urrutia DO    Discharge Diagnoses   1.  Left-sided renal calculi with hydronephrosis  2. N/V and dehydration    Other medical issues:  History reviewed. No pertinent past medical history.  Hyperlipidemia    History of Present Illness   Marilee Bell is an 72 year old female who presented with four days of left lateral abd/flank pain and associated N/V.  Please see the admission history and physical for full details.    Hospital Course   Marilee Bell was admitted on 10/1/2017.  The following problems were addressed during her hospitalization:    1.  Left-sided renal calculi with associated hydronephrosis  Ms. Hunt presented with left-sided abd pain, N/V.  She was found to have a 5mm stone on CT performed in the ED.  Urology was consulted and pt underwent left ureteroscopy with laser lithrotripsy and stent placment by Dr. Lara of Urology.  She tolerated the procedure well and was able to be D/C from the PACU.  Further d/c and follow-up instructions were given by Dr. Lara.  ASA at d/c discussed with pt by urology.      Significant Results and Procedures   Stone removal and stent placement by urology, as above.    Pending Results   Unresulted Labs Ordered in the Past 30 Days of this Admission     No orders found from 8/2/2017 to 10/2/2017.          Code Status   Full Code       Primary Care Physician   Margy Fletcher    Physical Exam   Temp: 97.5  F (36.4  C) Temp src: Oral BP: 95/40 Pulse: (!) 47 Heart Rate: 52 Resp: 16 SpO2: 94 % O2 Device: None (Room air)    Vitals:    10/01/17 1148   Weight: 70 kg (154 lb 5.2 oz)     Vital Signs with Ranges  Temp:  [97.5  F (36.4  C)] 97.5  F (36.4  C)  Pulse:  [47] 47  Heart Rate:  [49-52] 52  Resp:  [16] 16  BP: ()/(40-74) 95/40  SpO2:  [90 %-97 %] 94 %  I/O last 3 completed shifts:  In: 1000  [I.V.:1000]  Out: 20 [Emesis/NG output:20]   PT SEEN AND EXAMINED ON DAY OF D/C  GEN:  Alert, oriented x 3, mildly uncomfortable at rest  HEENT:  Normocephalic/atraumatic, PERRL, no scleral icterus, no nasal discharge, mouth moist  CV:  Regular rate and rhythm, soft murmur to ausc.  S1 + S2 noted, no S3 or S4.  LUNGS:  Clear to auscultation ant/lat bilaterally.  No rales/rhonchi/wheezing auscultated bilaterally.  No costal retractions bilaterally.  Symmetric chest rise on inhalation noted.  ABD:  Slower but normo-sounding bowel sounds, soft, tenderness to the right lat abd wall on exam, mildly distended.  No rebound/guarding/rigidity.  No masses palpated.  No obvious HSM to exam.  EXT:  No edema or cyanosis bilaterally. No joint synovitis noted.  No calf-tenderness or asymmetry noted.  SKIN:  Dry to touch, no rashes or jaundice noted.  PSYCH:  Mood appropriate, Not tearful or depressed.  Maintains direct eye contact.    Discharge Disposition   Discharged to home    Consultations This Hospital Stay   UROLOGY IP CONSULT    Time Spent on this Encounter   IMary, personally saw the patient today and spent greater than 30 minutes discharging this patient.    Discharge Orders   No discharge procedures on file.  Discharge Medications   Current Discharge Medication List      CONTINUE these medications which have NOT CHANGED    Details   aspirin 81 MG chewable tablet Take 81 mg by mouth daily      multivitamin, therapeutic with minerals (MULTI-VITAMIN) TABS tablet Take 1 tablet by mouth daily      SIMVASTATIN PO Take 20 mg by mouth At Bedtime           Allergies   Allergies   Allergen Reactions     Sulfa Drugs      Data     Recent Labs  Lab 10/01/17  0738   WBC 8.2   HGB 13.7   HCT 39.7   MCV 93          Recent Labs  Lab 10/01/17  0738      POTASSIUM 3.8   CHLORIDE 108   CO2 26   ANIONGAP 7   *   BUN 24   CR 1.00   GFRESTIMATED 55*   GFRESTBLACK 66   LORRAINE 9.3     No results for  input(s): LACT in the last 168 hours.    Recent Labs  Lab 10/01/17  0745   COLOR Yellow   APPEARANCE Slightly Cloudy   URINEGLC Negative   URINEBILI Negative   URINEKETONE 20*   SG 1.023   UBLD Small*   URINEPH 5.0   PROTEIN Negative   NITRITE Negative   LEUKEST Negative   RBCU 17*   WBCU 3*     Results for orders placed or performed during the hospital encounter of 10/01/17   CT Abdomen Pelvis w Contrast    Narrative    CT ABDOMEN AND PELVIS WITH CONTRAST 10/1/2017 9:18 AM     HISTORY: Left-sided abd pain and vomiting, history of diverticulitis.    COMPARISON: None.    TECHNIQUE: Volumetric helical acquisition of CT images from the lung  bases through the symphysis pubis after the administration of 75mL  Isovue-370  intravenous contrast. Radiation dose for this scan was  reduced using automated exposure control, adjustment of the mA and/or  kV according to patient size, or iterative reconstruction technique.    FINDINGS: There is a 5 mm stone at the left ureterovesicular junction  with moderate proximal hydronephrosis. Mild-moderate perinephric  stranding and fluid. At least three nonobstructing stones are seen in  the left kidney measuring up to 4 mm. An upper pole stone on the right  measures 2 mm. No right ureteral stones. There is moderate  diverticulosis without evidence for diverticulitis. There are no  dilated loops of small intestine or large bowel to suggest ileus or  obstruction. No free air or free fluid. The liver, spleen, adrenal  glands, kidneys, and pancreas demonstrate no worrisome focal lesion.  There are moderate atherosclerotic changes of the visualized aorta and  its branches. There is no evidence of aortic dissection or aneurysm.  Gallbladder unremarkable. Trace peripheral fibrosis and/or atelectasis  at the lung bases. Survey of the visualized bony structures  demonstrates no destructive bony lesions.      Impression    IMPRESSION:  1. 5 mm stone at the left ureterovesicular junction with mild  proximal  hydronephrosis and stranding.  2. Bilateral nonobstructing stones.     AYDE RODRIGUEZ MD   [facility] Surgery JULIENNE L/T 5 Min Fluoro w Stills    Narrative    This exam was marked as non-reportable because it will not be read by a   radiologist or a [facility] non-radiologist provider.

## 2017-10-01 NOTE — H&P
[facility]    Hospitalist History and Physical    Name: Marilee Bell    MRN: [id number]  YOB: 1945    Age: 72 year old  Date of Admission:  10/1/2017    Assessment & Plan   Marilee Bell is a 72 year old female who presented to [facility] with four days of progressive left sided abd/flank pain associated with N/V in the last 12 hours.    1.  Left sided renal calculi with associated hydronephrosis  Ms. Espinoza presents with several days of progressive left-sided abd pain/flank pain.  She was found to have a 5mm stone at the left UVJ with associated, mild hydronephrosis and stranding.  Her symptoms are improved with IV narcotics at the movement---but has not got up out of bed yet in OBS room.  Will keep NPO; urology consult placed and is pending.  Will continue IVF, monitor I/O's and strain urine.  Continue supportive care with IVF, anti-emetics and narcotics.  Continue with daily flomax, as well.    2.  Hyperlipidemia  Hold her home statin, for now.  Will add low cholesterol diet to her orders when she is able to eat.    3.  N/V and dehydration  Continue IVF and anti-emetics    DVT Prophylaxis: Pneumatic Compression Devices  Code Status: Full Code    Disposition: Expected discharge in 1-2 days once pain is controlled and/or urology intervention is needed    Primary Care Physician   Margy Fletcher    Chief Complaint   Left-sided abd/flank pain and N/V    History is obtained from the patient    History of Present Illness   Marilee Bell is a 72 year old female who presents with four days of feeling poorly.  She noted some mild left-sided discomfort four days ago.  She had mild anorexia over the last two days.  Her pain intensified and was severe earlier this am so she came into the ED.  She had associated N/V several times.  She was unable to get comfortable---currently her pain and nausea is improved at rest after IV anti-emetics and IV narcotics.    No recent  CP, SOB, F/C.  No gross hematuria.  No prior kidney stones.  No diarrhea, melena or gross red blood per rectum.      Past Medical History    History reviewed. No pertinent past medical history.  Hyperlipidemia  H/o basal cell cancer  H/o sciatica    Past Surgical History   History reviewed. No pertinent surgical history.    Prior to Admission Medications   Prior to Admission Medications   Prescriptions Last Dose Informant Patient Reported? Taking?   SIMVASTATIN PO 9/30/2017 at Unknown time  Yes Yes   Sig: Take 20 mg by mouth At Bedtime   aspirin 81 MG chewable tablet 9/30/2017 at Unknown time  Yes Yes   Sig: Take 81 mg by mouth daily   multivitamin, therapeutic with minerals (MULTI-VITAMIN) TABS tablet 9/30/2017 at Unknown time  Yes Yes   Sig: Take 1 tablet by mouth daily      Facility-Administered Medications: None     Allergies   Allergies   Allergen Reactions     Sulfa Drugs        Social History   Social History   Substance Use Topics     Smoking status: Never Smoker     Smokeless tobacco: Never Used     Alcohol use Not on file     Social History     Social History Narrative     No narrative on file   no regular, heavy etoh use reported    Family History   I have reviewed this patient's family history and updated it with pertinent information if needed.     CAD - mother, father, brother  Breast cancer - both maternal and paternal aunts    No family history on file.    Review of Systems   A Comprehensive greater than 11 system review of systems was carried out.  Pertinent positives and negatives are noted above.  Otherwise negative for contributory information.    Physical Exam   Temp: 97.5  F (36.4  C) Temp src: Oral BP: 95/40 Pulse: (!) 47 Heart Rate: 52 Resp: 16 SpO2: 94 % O2 Device: None (Room air)    Vital Signs with Ranges  Temp:  [97.5  F (36.4  C)] 97.5  F (36.4  C)  Pulse:  [47] 47  Heart Rate:  [49-52] 52  Resp:  [16] 16  BP: ()/(40-74) 95/40  SpO2:  [90 %-97 %] 94 %  154 lbs 5.15 oz      GEN:   Alert, oriented x 3, mildly uncomfortable at rest, pale  HEENT:  Normocephalic/atraumatic, pupils reactive, no scleral icterus, no nasal discharge, mouth and membranes slightly dry, but no clear oral ulcers or thrush noted.  CV:  Regular rate and rhythm, soft sys murmur to ausc.  S1 + S2 noted, no S3 or S4.  NECK: no clear thyromegaly or JVD  LA: none palpable in the cervical/clavicular area bilaterally  LUNGS:  Clear to auscultation ant/lat bilaterally.  No clear rales/rhonchi/wheezing auscultated bilaterally.  No costal retractions bilaterally.  Symmetric chest rise on inhalation noted.  ABD:  Active bowel sounds, soft, non-tender, mild distension  No clear rebound/guarding/rigidity.  No masses palpated.  No obvious HSM to exam.  EXT:  No edema or cyanosis bilaterally. No joint synovitis noted.  No calf-tenderness or asymmetry noted.  SKIN:  Dry to touch, no rashes or jaundice noted.  PSYCH:  Mood appropriate, Not tearful or depressed.  Maintains direct eye contact.  Appears worried  NEURO:  Symmetric muscle strength, sensation to touch grossly intact.  Coordination symmetric on general exam.  No new focal deficits appreciated.  No tremors at rest.    Data   Data reviewed today:  I reviewed the studies reports and additionally personally reviewed the abdominal CT image(s) showing 5mm stone at the UVJ on the left with mild hydronephrosis and stranding.      Recent Labs  Lab 10/01/17  0738   WBC 8.2   HGB 13.7   HCT 39.7   MCV 93          Recent Labs  Lab 10/01/17  0738      POTASSIUM 3.8   CHLORIDE 108   CO2 26   ANIONGAP 7   *   BUN 24   CR 1.00   GFRESTIMATED 55*   GFRESTBLACK 66   LORRAINE 9.3       Recent Labs  Lab 10/01/17  0738      POTASSIUM 3.8   CHLORIDE 108   CO2 26   ANIONGAP 7   *   BUN 24   CR 1.00   GFRESTIMATED 55*   GFRESTBLACK 66   LORRAINE 9.3   PROTTOTAL 7.6   ALBUMIN 4.0   BILITOTAL 0.8   ALKPHOS 66   AST 22   ALT 31     No results for input(s): INR in the last 168  hours.    Recent Labs  Lab 10/01/17  0745   COLOR Yellow   APPEARANCE Slightly Cloudy   URINEGLC Negative   URINEBILI Negative   URINEKETONE 20*   SG 1.023   UBLD Small*   URINEPH 5.0   PROTEIN Negative   NITRITE Negative   LEUKEST Negative   RBCU 17*   WBCU 3*       Recent Results (from the past 24 hour(s))   CT Abdomen Pelvis w Contrast    Narrative    CT ABDOMEN AND PELVIS WITH CONTRAST 10/1/2017 9:18 AM     HISTORY: Left-sided abd pain and vomiting, history of diverticulitis.    COMPARISON: None.    TECHNIQUE: Volumetric helical acquisition of CT images from the lung  bases through the symphysis pubis after the administration of 75mL  Isovue-370  intravenous contrast. Radiation dose for this scan was  reduced using automated exposure control, adjustment of the mA and/or  kV according to patient size, or iterative reconstruction technique.    FINDINGS: There is a 5 mm stone at the left ureterovesicular junction  with moderate proximal hydronephrosis. Mild-moderate perinephric  stranding and fluid. At least three nonobstructing stones are seen in  the left kidney measuring up to 4 mm. An upper pole stone on the right  measures 2 mm. No right ureteral stones. There is moderate  diverticulosis without evidence for diverticulitis. There are no  dilated loops of small intestine or large bowel to suggest ileus or  obstruction. No free air or free fluid. The liver, spleen, adrenal  glands, kidneys, and pancreas demonstrate no worrisome focal lesion.  There are moderate atherosclerotic changes of the visualized aorta and  its branches. There is no evidence of aortic dissection or aneurysm.  Gallbladder unremarkable. Trace peripheral fibrosis and/or atelectasis  at the lung bases. Survey of the visualized bony structures  demonstrates no destructive bony lesions.      Impression    IMPRESSION:  1. 5 mm stone at the left ureterovesicular junction with mild proximal  hydronephrosis and stranding.  2. Bilateral  nonobstructing stones.     AYDE RODRIGUEZ MD

## 2017-10-01 NOTE — IP AVS SNAPSHOT
[facility]    [address]    [facility] [address]    Phone:  [phone]    Fax:  [phone]                                       After Visit Summary   10/1/2017    Marilee Bell    MRN: [id number]           After Visit Summary Signature Page     I have received my discharge instructions, and my questions have been answered. I have discussed any challenges I see with this plan with the nurse or doctor.    ..........................................................................................................................................  Patient/Patient Representative Signature      ..........................................................................................................................................  Patient Representative Print Name and Relationship to Patient    ..................................................               ................................................  Date                                            Time    ..........................................................................................................................................  Reviewed by Signature/Title    ...................................................              ..............................................  Date                                                            Time

## 2017-10-01 NOTE — IP AVS SNAPSHOT
MRN:[id number]                      After Visit Summary   10/1/2017    Marilee Bell    MRN: [id number]           Thank you!     Thank you for choosing [facility] for your care. Our goal is always to provide you with excellent care. Hearing back from our patients is one way we can continue to improve our services. Please take a few minutes to complete the written survey that you may receive in the mail after you visit. If you would like to speak to someone directly about your visit please contact Patient Relations at [phone]. Thank you!          Patient Information     Date Of Birth          1945        About your hospital stay     You were admitted on:  October 1, 2017 You last received care in the:  [facility]    You were discharged on:  October 1, 2017        Reason for your hospital stay       Left Ureteral Stone                  Who to Call     For medical emergencies, please call 911.  For non-urgent questions about your medical care, please call your primary care provider or clinic, [phone]  For questions related to your surgery, please call your surgery clinic        Attending Provider     Provider Darlene Sanchez MD Emergency Medicine    ArnoldMary Thomas DO Internal Medicine       Primary Care Provider Office Phone # Fax #    Margy GONZÁLEZ Fletcher [phone]      Follow-up Appointments     Follow-up and recommended labs and tests        Follow up with me,  Ahmet Lara, within 1-2 weeks. to evaluate after surgery.  The following labs/tests are recommended: Stent removal.                  Further instructions from your care team       CYSTOSCOPY DISCHARGE INSTRUCTIONS  [facility] / UROLOGY  [facility]  [phone]    YOU MAY GO BACK TO YOUR NORMAL DIET AND ACTIVITY, UNLESS YOUR DOCTOR TELLS YOU NOT TO.    FOR THE NEXT TWO DAYS, YOU MAY NOTICE:    SOME BLOOD IN  YOUR URINE.  SOME BURNING WHEN YOU URINATE (USE THE TOILET).  AN URGE TO URINATE MORE OFTEN.  BLADDER SPASMS.    THESE ARE NORMAL AFTER THE PROCEDURE.  THEY SHOULD GO AWAY AFTER A DAY OR TWO.  TO RELIEVE THESE PROBLEMS:     DRINK 6 TO 8 LARGE GLASSES OF WATER EACH DAY (INCLUDES DRINKS AT MEALS).  THIS WILL HELP CLEAR THE URINE.    TAKE WARM BATHS TO RELIEVE PAIN AND BLADDER SPASMS.  DO NOT ADD ANYTHING TO THE BATH WATER.    YOUR DOCTOR MAY PRESCRIBE PAIN MEDICINE.  YOU MAY ALSO TAKE TYLENOL (ACETAMINOPHEN) FOR PAIN.    CALL YOUR SURGEON IF YOU HAVE:    A FEVER OVER 101 DEGREES.  CHECK YOUR TEMPERATURE UNDER YOUR TONGUE.    CHILLS.    FAILURE TO URINATE (NO URINE COMES OUT WHEN YOU TRY TO USE THE TOILET).  TRY SOAKING IN A BATHTUB FULL OF WARM WATER.  IF STILL NO URINE, CALL YOUR DOCTOR.    A LOT OF BLOOD IN THE URINE, OR BLOOD CLOTS LARGER THAN A NICKEL.      PAIN IN THE BACK OR BELLY AREA (ABDOMEN).    PAIN OR SPASMS THAT ARE NOT RELIEVED BY WARM TUB BATHS AND PAIN MEDICINE.      SEVERE PAIN, BURNING OR OTHER PROBLEMS WHILE PASSING URINE.    PAIN THAT GETS WORSE AFTER TWO DAYS.        STENT INFORMATION/DISCHARGE INSTRUCTIONS  [facility] / UROLOGY  [facility] [address]  [phone]    During surgery, a stent may be placed in the ureter.  The ureter is the tube that drains urine from the kidney to the bladder.  The stent is placed to dilate (open) the ureter so stone fragments can pass easily through the ureter or to decrease ureteral swelling after surgery or to relieve an obstruction.      The stent is made of silicone.  The upper end of the stent curls in the kidney while the lower end rests in the bladder.    While the stent is in place you may experience the following symptoms:  Blood and/or small blood clots in the urine  Bladder spasms (frequency and urgency of urination)  Discomfort or aching in the back or side where the stent is  Burning or discomfort at the end of urine stream    To  decrease these symptoms you should:  Take antispasmodic medication as prescribed (Detrol, Ditropan, etc.)  Drink plenty of fluids but avoid caffeine and citrus (include cranberry)  If you are having discomfort in back or side, decrease activity    Please call your physician or the physician on call if you experience:  Fever greater than 101 degrees  Severe pain not relieved by pain medication or rest    Please make an appointment for the removal of the stent according to your physician's instructions.      GENERAL ANESTHESIA OR SEDATION ADULT DISCHARGE INSTRUCTIONS   SPECIAL PRECAUTIONS FOR 24 HOURS AFTER SURGERY    IT IS NOT UNUSUAL TO FEEL LIGHT-HEADED OR FAINT, UP TO 24 HOURS AFTER SURGERY OR WHILE TAKING PAIN MEDICATION.  IF YOU HAVE THESE SYMPTOMS; SIT FOR A FEW MINUTES BEFORE STANDING AND HAVE SOMEONE ASSIST YOU WHEN YOU GET UP TO WALK OR USE THE BATHROOM.    YOU SHOULD REST AND RELAX FOR THE NEXT 24 HOURS AND YOU MUST MAKE ARRANGEMENTS TO HAVE SOMEONE STAY WITH YOU FOR AT LEAST 24 HOURS AFTER YOUR DISCHARGE.  AVOID HAZARDOUS AND STRENUOUS ACTIVITIES.  DO NOT MAKE IMPORTANT DECISIONS FOR 24 HOURS.    DO NOT DRIVE ANY VEHICLE OR OPERATE MECHANICAL EQUIPMENT FOR 24 HOURS FOLLOWING THE END OF YOUR SURGERY.  EVEN THOUGH YOU MAY FEEL NORMAL, YOUR REACTIONS MAY BE AFFECTED BY THE MEDICATION YOU HAVE RECEIVED.    DO NOT DRINK ALCOHOLIC BEVERAGES FOR 24 HOURS FOLLOWING YOUR SURGERY.    DRINK CLEAR LIQUIDS (APPLE JUICE, GINGER ALE, 7-UP, BROTH, ETC.).  PROGRESS TO YOUR REGULAR DIET AS YOU FEEL ABLE.    YOU MAY HAVE A DRY MOUTH, A SORE THROAT, MUSCLES ACHES OR TROUBLE SLEEPING.  THESE SHOULD GO AWAY AFTER 24 HOURS.    CALL YOUR DOCTOR FOR ANY OF THE FOLLOWING:  SIGNS OF INFECTION (FEVER, GROWING TENDERNESS AT THE SURGERY SITE, A LARGE AMOUNT OF DRAINAGE OR BLEEDING, SEVERE PAIN, FOUL-SMELLING DRAINAGE, REDNESS OR SWELLING.    IT HAS BEEN OVER 8 TO 10 HOURS SINCE SURGERY AND YOU ARE STILL NOT ABLE TO URINATE (PASS  "WATER).       Pending Results     Date and Time Order Name Status Description    10/1/2017 0710 EKG 12-lead, tracing only Preliminary             Admission Information     Date & Time Provider Department Dept. Phone    10/1/2017 Mary Urrutia DO [facility] [phone]      Your Vitals Were     Blood Pressure Pulse Temperature Respirations Height Weight    107/62 47 97.2  F (36.2  C) (Temporal) 12 1.549 m (5' 1\") 70 kg (154 lb 5.2 oz)    Pulse Oximetry BMI (Body Mass Index)                92% 29.16 kg/m2          MyChart Information     [employer] lets you send messages to your doctor, view your test results, renew your prescriptions, schedule appointments and more. To sign up, go to [email or web address] . Click on \"Log in\" on the left side of the screen, which will take you to the Welcome page. Then click on \"Sign up Now\" on the right side of the page.     You will be asked to enter the access code listed below, as well as some personal information. Please follow the directions to create your username and password.     Your access code is: [email or web address]  Expires: 2017  5:53 PM     Your access code will  in 90 days. If you need help or a new code, please call your [place] clinic or [phone].        Care EveryWhere ID     This is your Care EveryWhere ID. This could be used by other organizations to access your [place] medical records  [id number]        Equal Access to Services     [facility] AH: Hadii aad ku hadasho Soomaali, waaxda luqadaha, qaybta kaalmada adeegyada, obed katz . So [facility] [phone].    ATENCIÓN: Si habla español, tiene a merlos disposición servicios gratuitos de asistencia lingüística. Llame al [phone].    We comply with applicable federal civil rights laws and [place] laws. We do not discriminate on the basis of race, color, national origin, age, disability, sex, sexual orientation, or gender " identity.               Review of your medicines      START taking        Dose / Directions    ciprofloxacin 250 MG tablet   Commonly known as:  CIPRO   Used for:  Kidney stone        Dose:  250 mg   Take 1 tablet (250 mg) by mouth 2 times daily for 2 days   Quantity:  4 tablet   Refills:  0       HYDROcodone-acetaminophen 5-325 MG per tablet   Commonly known as:  NORCO   Used for:  Kidney stone        Dose:  1 tablet   Take 1 tablet by mouth every 6 hours as needed for moderate to severe pain   Quantity:  8 tablet   Refills:  0         CONTINUE these medicines which have NOT CHANGED        Dose / Directions    aspirin 81 MG chewable tablet        Dose:  81 mg   Take 81 mg by mouth daily   Refills:  0       SIMVASTATIN PO        Dose:  20 mg   Take 20 mg by mouth At Bedtime   Refills:  0         STOP taking     Multi-vitamin Tabs tablet                Where to get your medicines      These medications were sent to [facility] [address] [address] [facility]  [address] [facility] [address]     Phone:  [phone]     ciprofloxacin 250 MG tablet         Some of these will need a paper prescription and others can be bought over the counter. Ask your nurse if you have questions.     Bring a paper prescription for each of these medications     HYDROcodone-acetaminophen 5-325 MG per tablet               ANTIBIOTIC INSTRUCTION     You've Been Prescribed an Antibiotic - Now What?  Your healthcare team thinks that you or your loved one might have an infection. Some infections can be treated with antibiotics, which are powerful, life-saving drugs. Like all medications, antibiotics have side effects and should only be used when necessary. There are some important things you should know about your antibiotic treatment.      Your healthcare team may run tests before you start taking an antibiotic.    Your team may take samples (e.g., from your blood, urine or other areas) to run tests to  look for bacteria. These test can be important to determine if you need an antibiotic at all and, if you do, which antibiotic will work best.      Within a few days, your healthcare team might change or even stop your antibiotic.    Your team may start you on an antibiotic while they are working to find out what is making you sick.    Your team might change your antibiotic because test results show that a different antibiotic would be better to treat your infection.    In some cases, once your team has more information, they learn that you do not need an antibiotic at all. They may find out that you don't have an infection, or that the antibiotic you're taking won't work against your infection. For example, an infection caused by a virus can't be treated with antibiotics. Staying on an antibiotic when you don't need it is more likely to be harmful than helpful.      You may experience side effects from your antibiotic.    Like all medications, antibiotics have side effects. Some of these can be serious.    Let you healthcare team know if you have any known allergies when you are admitted to the hospital.    One significant side effect of nearly all antibiotics is the risk of severe and sometimes deadly diarrhea caused by Clostridium difficile (C. Difficile). This occurs when a person takes antibiotics because some good germs are destroyed. Antibiotic use allows C. diificile to take over, putting patients at high risk for this serious infection.    As a patient or caregiver, it is important to understand your or your loved one's antibiotic treatment. It is especially important for caregivers to speak up when patients can't speak for themselves. Here are some important questions to ask your healthcare team.    What infection is this antibiotic treating and how do you know I have that infection?    What side effects might occur from this antibiotic?    How long will I need to take this antibiotic?    Is it safe to take  this antibiotic with other medications or supplements (e.g., vitamins) that I am taking?     Are there any special directions I need to know about taking this antibiotic? For example, should I take it with food?    How will I be monitored to know whether my infection is responding to the antibiotic?    What tests may help to make sure the right antibiotic is prescribed for me?      Information provided by:  www.cdc.gov/getsmart  U.S. Department of Health and Human Services  Centers for disease Control and Prevention  National Center for Emerging and Zoonotic Infectious Diseases  Division of Healthcare Quality Promotion         Protect others around you: Learn how to safely use, store and throw away your medicines at www.disposemymeds.org.             Medication List: This is a list of all your medications and when to take them. Check marks below indicate your daily home schedule. Keep this list as a reference.      Medications           Morning Afternoon Evening Bedtime As Needed    aspirin 81 MG chewable tablet   Take 81 mg by mouth daily                                ciprofloxacin 250 MG tablet   Commonly known as:  CIPRO   Take 1 tablet (250 mg) by mouth 2 times daily for 2 days                                HYDROcodone-acetaminophen 5-325 MG per tablet   Commonly known as:  NORCO   Take 1 tablet by mouth every 6 hours as needed for moderate to severe pain   Last time this was given:  1 tablet on 10/1/2017  9:45 AM                                SIMVASTATIN PO   Take 20 mg by mouth At Bedtime

## 2017-10-01 NOTE — ED PROVIDER NOTES
"  History     Chief Complaint:  No chief complaint on file.       HPI   Marilee Bell is a 72 year old female with history of diveticulitis, who presents with abdominal pain.  Pain is present in her LLQ, radiates across her lower abdomen, and is intermittent and sharp.  Currently 6/10 in intensity, but at times it is worse.  Has vomited once.  Denies fevers and diarrhea.  Denies chest pain and shortness of breath.  She has not had any bloody stools, dysuria, or increased frequency.  Patient feels like she cannot get comfortable.      Allergies:  Sulfa Drugs     Medications:      aspirin 81 MG chewable tablet   multivitamin, therapeutic with minerals (MULTI-VITAMIN) TABS tablet   SIMVASTATIN PO       Past Medical History:    History reviewed. No pertinent past medical history.    There are no active problems to display for this patient.   Diverticulitis    Past Surgical History:    History reviewed. No pertinent surgical history.       Family History:    family history is not on file.    Social History:   reports that she has never smoked. She has never used smokeless tobacco.    PCP: No primary care provider on file.     Review of Systems   Constitutional: Negative for fever.   Gastrointestinal: Positive for abdominal pain, nausea and vomiting. Negative for diarrhea.   All other systems reviewed and are negative.        Physical Exam     Patient Vitals for the past 24 hrs:   BP Temp Temp src Heart Rate Resp SpO2 Height   10/01/17 0900 - - - - - 96 % -   10/01/17 0845 129/68 - - - - 93 % -   10/01/17 0830 113/61 - - - - 93 % -   10/01/17 0815 122/57 - - - - 93 % -   10/01/17 0800 132/65 - - - - 97 % -   10/01/17 0752 134/62 - - - - 96 % -   10/01/17 0709 134/64 97.5  F (36.4  C) Oral 49 16 97 % 1.575 m (5' 2\")        Physical Exam  Constitutional: Alert, attentive, GCS 15  HENT:    Nose: Nose normal.    Mouth/Throat: Oropharynx is clear, mucous membranes are moist   Eyes: Normal conjunctiva. Pupils are equal, " round, and reactive to light.   CV: regular rate and rhythm; no murmurs, rubs or gallups  Chest: Effort normal and breath sounds normal.   GI:  There is tenderness in her left abdomen (upper and lower) without guarding or rebound. No distension. Normal bowel sounds  MSK: Normal range of motion.    Neurological: Alert, attentive, strength intact  Skin: Skin is warm and dry.      Emergency Department Course     ECG (7:21:41):  Rate 46 bpm. Sinus bradycardia, low voltage QRS, left anterior fasicular block.  No old EKG for comparison.  QT/QTc 486/425 ms.   P-R-T axes 60 -49 34.    Interpreted at 7:36 am by Darlene Phipps MD.     Imaging:  CT Abdomen Pelvis w Contrast   Preliminary Result   IMPRESSION:   1. 5 mm stone at the left ureterovesicular junction with mild proximal   hydronephrosis and stranding.   2. Bilateral nonobstructing stones.            Laboratory:  Labs Ordered and Resulted from Time of ED Arrival Up to the Time of Departure from the ED   CBC WITH PLATELETS DIFFERENTIAL - Abnormal; Notable for the following:        Result Value    Absolute Lymphocytes 0.6 (*)     All other components within normal limits   COMPREHENSIVE METABOLIC PANEL - Abnormal; Notable for the following:     Glucose 121 (*)     GFR Estimate 55 (*)     All other components within normal limits   URINE MACROSCOPIC WITH REFLEX TO MICRO - Abnormal; Notable for the following:     Ketones Urine 20 (*)     Blood Urine Small (*)     RBC Urine 17 (*)     WBC Urine 3 (*)     Mucous Urine Present (*)     All other components within normal limits   TROPONIN I   LIPASE        Procedures: none    Interventions:    Medications   HYDROmorphone (PF) (DILAUDID) injection 0.5 mg (0.5 mg Intravenous Given 10/1/17 0925)   ondansetron (ZOFRAN) injection 4 mg (4 mg Intravenous Given 10/1/17 0857)   0.9% sodium chloride BOLUS (0 mLs Intravenous Stopped 10/1/17 0900)     Followed by   0.9% sodium chloride infusion (1,000 mLs Intravenous New Bag  10/1/17 0858)   ketorolac (TORADOL) injection 15 mg (not administered)   HYDROcodone-acetaminophen (NORCO) 5-325 MG per tablet 1 tablet (not administered)   iopamidol (ISOVUE-370) solution 500 mL (75 mLs Intravenous Given 10/1/17 0911)   0.9% sodium chloride BOLUS (0 mLs Intravenous Stopped 10/1/17 0914)        Emergency Department Course:  Past medical records, nursing notes, and vitals reviewed.  I performed an exam of the patient and obtained history, as documented above.    9:35 am: I rechecked the patient.  Has vomited a couple times, pain starting to improve.  Findings and plan explained to the Patient.  Will try to transition to PO pain medication.    10:14 am: Re-evaluated and has continued to vomit. Continues to have pain.  Patient prefers to be admitted for symptom control.    Impression & Plan         Medical Decision Makin year old female with history of diverticulitis, presenting with left sided abdominal pain.  DDx includes diverticulitis, renal stone, ACS, gastroenteritis, colitis, mesenteric ischemia, pneumonia.  Patient has 5 mm left sided obstructing ureteral stone with mild hydronephrosis seen on CT A/P.  Patient does not have evidence of infected stone.  She has had multiple episodes of vomiting in the ED, despite IV medications.  I am concerned she will have poor pain control at home if she is unable to keep down PO medications. She prefers to be admitted under observation for IVF, pain/nausea medications.  Urologist will consult and plan discussed with hospitalist.      Diagnosis:    ICD-10-CM    1. Kidney stone N20.0         Discharge Medications:  New Prescriptions    No medications on file        10/1/2017   Darlene Phipps MD Lum, Marija Margaret, MD  10/01/17 5880

## 2017-10-01 NOTE — PLAN OF CARE
Problem: Patient Care Overview  Goal: Plan of Care/Patient Progress Review  OBSERVATION PRE-OP NOTE     Discharge Plan (Initial) - plan is for pt to discharge from Same Day Surgery: Pt may discharge if all goals are met  Discharge Order - has been entered by the attending hospitalist or P.A.: No  Ride Home/Caregiver - pt has someone to take them home and stay with them for 24 hours:  Rich  Belongings/Valuables - sent with pt   -  No  Beta Blocker - No     LABS  Last Labs:    Recent Labs  Lab 10/01/17  0738   HGB 13.7      POTASSIUM 3.8   BUN 24   CR 1.00         HCG -   was declined by the pt. (Note: A female patient may decline this test at her discretion if she is certain there is no risk of pregnancy AND she is willing to accept potential risk. Anesthesiologist will review risks and benefits with patient.)   Abnormal Labs -  N/A      NPO -   Pt has been NPO since 9/30/17 at 5:30 PM  Shower/Bath -  PreOp shower or JANA bath has not been done on day of surgery.   Consent Status (if pt unable to sign consent) -  N/A - pt alert and oriented, able to sign consent     OBS Nurse Completing Pre-Op Handoff: Ronna Ward,   2:56 PM      Abx sent with pt along with belongings.      Above Pre Op Nurse Handoff Report was reviewed by:

## 2017-10-01 NOTE — ANESTHESIA CARE TRANSFER NOTE
Patient: Marilee Bell    Procedure(s):  COMBINED CYSTOSCOPY, LEFT URETEROSCOPY, LASER HOLMIUM LITHOTRIPSY URETER(S), INSERT LEFT STENT , BASKETING STONE FRAGMENTS - Wound Class: II-Clean Contaminated    Diagnosis: unknown  Diagnosis Additional Information: No value filed.    Anesthesia Type:   General, LMA     Note:  Airway :Face Mask  Patient transferred to:PACU        Vitals: (Last set prior to Anesthesia Care Transfer)    CRNA VITALS  10/1/2017 1641 - 10/1/2017 1718      10/1/2017             Pulse: 91    SpO2: 100 %    Resp Rate (observed): (!)  2                Electronically Signed By: MYRA Morris CRNA  October 1, 2017  5:18 PM

## 2017-10-01 NOTE — PLAN OF CARE
Problem: Patient Care Overview  Goal: Plan of Care/Patient Progress Review  Outcome: No Change  PRIMARY DIAGNOSIS: Abdominal Pain  OUTPATIENT/OBSERVATION GOALS TO BE MET BEFORE DISCHARGE:  1. ADLs back to baseline: Yes      2. Activity and level of assistance: SBA      3. Pain status: given med in ED, ok at rest      4. Return to near baseline physical activity: Yes, SBA for safety          Discharge Planner Nurse   Safe discharge environment identified: Yes  Barriers to discharge: No       Entered by: Ronna Ward 10/01/2017 12:25 PM     Please review provider order for any additional goals.   Nurse to notify provider when observation goals have been met and patient is ready for discharge.     Bradycardic and BP soft, A&Ox4, flat affect, LS clear, room air, BS A&Ax4, passing gas, denies nausea at this time, pain controlled as long as she's not moving, IVF, NPO since 1700 last night, plan for cysto with Dr. Lara at 1630, will continue to monitor and provide supportive cares.

## 2017-10-01 NOTE — CONSULTS
History: It is a great pleasure to see this very pleasant 72-year-old lady in initial consultation today at the request of the emergency room in the inpatient hospitalist service.  She was admitted through the emergency room earlier this afternoon with about a four-day history of intermittent and severe left-sided flank pain particularly with severe nausea and vomiting in the last 12 hours.  A CT scan that showed a 5 mm stone in the region of the left distal ureter with moderate hydronephrosis.  She was admitted because of the pain was very severe and she required pain control.  There is no significant previous history of urinary stone disease.  There is evidence of a family history of urinary stone  Her general health is otherwise stable.  She does have a history of hyperlipidemia disease however.  She is not gross hematuria    History reviewed. No pertinent past medical history.    History reviewed. No pertinent surgical history.    History reviewed. No pertinent family history.    Social History     Social History     Marital status:      Spouse name: N/A     Number of children: N/A     Years of education: N/A     Occupational History     Not on file.     Social History Main Topics     Smoking status: Never Smoker     Smokeless tobacco: Never Used     Alcohol use Not on file     Drug use: Not on file     Sexual activity: Not on file     Other Topics Concern     Not on file     Social History Narrative     No narrative on file       Current Outpatient Prescriptions   Medication Sig Dispense Refill     ciprofloxacin (CIPRO) 250 MG tablet Take 1 tablet (250 mg) by mouth 2 times daily for 2 days 4 tablet 0     HYDROcodone-acetaminophen (NORCO) 5-325 MG per tablet Take 1 tablet by mouth every 6 hours as needed for moderate to severe pain 8 tablet 0       Review Of Systems:  Skin: negative  Eyes: negative  Ears/Nose/Throat: negative  Respiratory: No shortness of breath, dyspnea on exertion, cough, or  "hemoptysis  Cardiovascular: negative  Gastrointestinal: negative  Genitourinary: negative  Musculoskeletal: negative  Neurologic: negative  Psychiatric: negative  Hematologic/Lymphatic/Immunologic: negative  Endocrine: negative    Exam:  BP (!) 99/35  Pulse (!) 47  Temp 97.2  F (36.2  C) (Temporal)  Resp 22  Ht 1.549 m (5' 1\")  Wt 70 kg (154 lb 5.2 oz)  SpO2 (!) 87%  BMI 29.16 kg/m2    General Impression: Very pleasant lady in moderate distress, well-oriented in time place and person.    Mental status.  Normal    HEENT: There is no evidence of clinical jaundice.  The mucous membranes are normal    Skin: The skin is otherwise normal to examination    Lymph Nodes: There is no significant lymphadenopathy     Respiratory System: The respiratory cycle was normal as normal percussion and auscultation    Cardiovascular: Heart sounds are normal there is a soft systolic murmur which is more clearly heard at the apex with no other remarkable features    Abdominal: Abdomen is soft, there are no palpable masses, there is no significant abdominal tenderness and no hernias detected    Extremities: No significant peripheral edema    Back and Flank: There is a mild increase in tenderness in the left flank as compared to the right    Genital: Not examined    Rectal: Not examined    Neurologic:  There are no focal abnormal clinical neurological signs in the central, peripheral nervous system    Impression: The patient has had severe left ureteric colic as a result of a 5 stone in the left distal ureter.  This is being causing severe symptoms around 4 days.  White cell count is normal, hemoglobin is normal and the serum creatinine is 1.0  Urinalysis did show significant red cells in urine with minimal white cells and the urine was nitrite negative    CT scan  FINDINGS: There is a 5 mm stone at the left ureterovesicular junction  with moderate proximal hydronephrosis. Mild-moderate perinephric  stranding and fluid. At least " "three nonobstructing stones are seen in  the left kidney measuring up to 4 mm. An upper pole stone on the right  measures 2 mm. No right ureteral stones. There is moderate  diverticulosis without evidence for diverticulitis. There are no  dilated loops of small intestine or large bowel to suggest ileus or  obstruction. No free air or free fluid. The liver, spleen, adrenal  glands, kidneys, and pancreas demonstrate no worrisome focal lesion.  There are moderate atherosclerotic changes of the visualized aorta and  its branches. There is no evidence of aortic dissection or aneurysm.  Gallbladder unremarkable. Trace peripheral fibrosis and/or atelectasis  at the lung bases. Survey of the visualized bony structures  demonstrates no destructive bony lesions.         IMPRESSION:  1. 5 mm stone at the left ureterovesicular junction with mild proximal  hydronephrosis and stranding.  2. Bilateral nonobstructing stones.     I discussed the situation carefully with the patient and her family.  Given the severity of symptoms I would recommend that we proceed to address the issue promptly and I will plan ureteroscopy, if necessary using the holmium laser and we'll need to place a stent in addition.  If all is well and anticipate she can probably go home shortly after the surgery and we will plan to see her in the office thereafter to remove the stent and to discuss whether we need to consider metabolic evaluation and also to discuss preventative measures.  I carefully discussed the entire situation in detail  I answered many questions         Plan: Left ureteroscopy with holmium laser lithotripsy and stent placement    \"This dictation was performed with voice recognition software and may contain errors,  omissions and inadvertent word substitution.\"    "

## 2017-10-01 NOTE — ANESTHESIA PREPROCEDURE EVALUATION
PAC NOTE:       ANESTHESIA PRE EVALUATION:  Anesthesia Evaluation     . Pt has had prior anesthetic. Type: General    No history of anesthetic complications          ROS/MED HX    ENT/Pulmonary:  - neg pulmonary ROS     Neurologic:  - neg neurologic ROS     Cardiovascular:     (+) Dyslipidemia, ----. : . . . :. .       METS/Exercise Tolerance:     Hematologic:  - neg hematologic  ROS       Musculoskeletal:  - neg musculoskeletal ROS       GI/Hepatic:  - neg GI/hepatic ROS       Renal/Genitourinary:     (+) Nephrolithiasis ,       Endo:  - neg endo ROS       Psychiatric:  - neg psychiatric ROS       Infectious Disease:  - neg infectious disease ROS       Malignancy:      - no malignancy   Other:    - neg other ROS                 Physical Exam  Normal systems: cardiovascular, pulmonary and dental    Airway   Mallampati: II  TM distance: >3 FB  Neck ROM: full    Dental     Cardiovascular   Rhythm and rate: regular and normal      Pulmonary    breath sounds clear to auscultation    Other findings: Lab Test        10/01/17                       0738          WBC          8.2           HGB          13.7          MCV          93            PLT          288            Lab Test        10/01/17                       0738          NA           141           POTASSIUM    3.8           CHLORIDE     108           CO2          26            BUN          24            CR           1.00          ANIONGAP     7             LORRAINE          9.3           GLC          121*                   ECG  Sinus bradycardia  Low voltage QRS  Left anterior fascicular block  Abnormal ECG  No previous ECGs available         Anesthesia Plan      History & Physical Review  History and physical reviewed and following examination; no interval change.    ASA Status:  3 .    NPO Status:  > 8 hours    Plan for General and LMA with Intravenous induction. Maintenance will be Balanced.    PONV prophylaxis:  Ondansetron (or other 5HT-3) and Dexamethasone or  Solumedrol       Postoperative Care  Postoperative pain management:  IV analgesics, Oral pain medications and Multi-modal analgesia.      Consents  Anesthetic plan, risks, benefits and alternatives discussed with:  Patient.  Use of blood products discussed: No .   .                            .

## 2017-10-02 LAB
COPATH REPORT: NORMAL
INTERPRETATION ECG - MUSE: NORMAL

## 2017-10-02 NOTE — OP NOTE
DATE OF SURGERY:  10/01/2017      PREOPERATIVE DIAGNOSIS:  Left distal ureteral stone.      POSTOPERATIVE DIAGNOSIS:  Left distal ureteral stone.      SURGEON:  Ahmet Lara MD      ANESTHESIA:  General      PROCEDURE:  Cystoscopy, left ureteroscopy, holmium laser lithotripsy of ureteral stone, stone basketing of fragments, insertion of left double-J stent, and fluoroscopic interpretation of images.      INDICATIONS:  Please see my preoperative consultation.      DESCRIPTION:  Marilee Bell was brought to the operative suite, and after induction of anesthesia, she was placed in the dorsal lithotomy position with the genitalia prepped and draped in customary fashion.      Timeout was then called.      A 24 Gabonese cystoscope was then passed through the urethra, which was normal, into the bladder.  The interior of the bladder was carefully inspected.  No neoplasm or stone was seen in the bladder.      The left ureter was identified, and a 0.035 Sensor wire passed into the left ureter and up into the renal pelvis.  I then secured the wire in the usual fashion, and then introduced the 6.9 Gabonese rigid ureteroscope through the urethra into the bladder, and carefully negotiated it into the left ureter, and passed it into the distal ureter where the stone could be seen.      Using a 365 holmium laser fiber with a setting of 0.2 joules at 50 a second, I then fragmented the stone into small pieces.  I withdrew the laser fiber, and inserted a 1.90 tip basket, and basketed the stone fragments piecemeal, sending some of the fragments to be analyzed, and the others were deposited in the bladder.  I then carefully reinspected the ureter with the ureteroscope, noticing only very small fragments remaining which will pass spontaneously.  I withdrew the ureteroscope, and then passed a 6 Gabonese 24 cm double-J stent over the safety guide wire until one end was in the kidney and the other was released in the bladder.  The bladder was  then drained with the cystoscope sheath, and the patient taken to the recovery room, having tolerated the procedure well.      CONCLUSION:  The patient should go home this evening if all is well.  She will have the stent in place.  She will return to the office in about a week for removal of the stent.      The stone fragments will be sent for analysis.      This was her first stone, and she is 72 years of age.  I therefore doubt that she will need extensive metabolic stone evaluation, but we may have discussions in the office about basic preventative measures to try to prevent further stones forming in the future.         AYDE GREEN MD             D: 10/01/2017 17:33   T: 10/02/2017 07:36   MT: BILLY#[id number]      Name:     RAFAT NOLAND   MRN:      [id number]        Account:        [id number]   :      1945           Procedure Date: 10/01/2017      Document: [id number]       cc: Margy Fletcher MD

## 2017-10-05 LAB
APPEARANCE STONE: NORMAL
COMPN STONE: NORMAL
NUMBER STONE: 5
SIZE STONE: NORMAL MM
WT STONE: 11 MG

## 2017-10-06 ENCOUNTER — TELEPHONE (OUTPATIENT)
Dept: UROLOGY | Facility: CLINIC | Age: 72
End: 2017-10-06

## 2017-10-06 NOTE — TELEPHONE ENCOUNTER
Triage Phone Call:    Patient c/o blood in urine.  Onset-yesterday.  Patient does have a stent.  This nurse instructed patient to drink at least 7-8 glasses of water.  Explained to patient that some blood is normal with stent.  Offers no further complaints at this time.  Patient will call office back with any more questions/changes in sx.    Kyra Cordoba LPN

## 2017-10-10 ENCOUNTER — OFFICE VISIT (OUTPATIENT)
Dept: UROLOGY | Facility: CLINIC | Age: 72
End: 2017-10-10
Payer: COMMERCIAL

## 2017-10-10 VITALS — BODY MASS INDEX: 29.07 KG/M2 | HEIGHT: 61 IN | WEIGHT: 154 LBS

## 2017-10-10 DIAGNOSIS — N20.1 URETERAL STONE: Primary | ICD-10-CM

## 2017-10-10 LAB
ALBUMIN UR-MCNC: >=300 MG/DL
APPEARANCE UR: ABNORMAL
BILIRUB UR QL STRIP: ABNORMAL
COLOR UR AUTO: ABNORMAL
GLUCOSE UR STRIP-MCNC: NEGATIVE MG/DL
HGB UR QL STRIP: ABNORMAL
KETONES UR STRIP-MCNC: ABNORMAL MG/DL
LEUKOCYTE ESTERASE UR QL STRIP: ABNORMAL
NITRATE UR QL: NEGATIVE
PH UR STRIP: 5.5 PH (ref 5–7)
SOURCE: ABNORMAL
SP GR UR STRIP: >1.03 (ref 1–1.03)
UROBILINOGEN UR STRIP-ACNC: 0.2 EU/DL (ref 0.2–1)

## 2017-10-10 PROCEDURE — 81003 URINALYSIS AUTO W/O SCOPE: CPT | Performed by: UROLOGY

## 2017-10-10 PROCEDURE — 52310 CYSTOSCOPY AND TREATMENT: CPT | Performed by: UROLOGY

## 2017-10-10 PROCEDURE — 99212 OFFICE O/P EST SF 10 MIN: CPT | Mod: 25 | Performed by: UROLOGY

## 2017-10-10 RX ORDER — CIPROFLOXACIN 500 MG/1
500 TABLET, FILM COATED ORAL 2 TIMES DAILY
Qty: 1 TABLET | Refills: 0 | Status: SHIPPED | OUTPATIENT
Start: 2017-10-10 | End: 2018-10-28

## 2017-10-10 ASSESSMENT — PAIN SCALES - GENERAL: PAINLEVEL: NO PAIN (0)

## 2017-10-10 NOTE — MR AVS SNAPSHOT
"              After Visit Summary   10/10/2017    Marilee Bell    MRN: [id number]           Patient Information     Date Of Birth          1945        Visit Information        Provider Department      10/10/2017 8:00 AM Ahmet Lara MD; [facility] [place]        Today's Diagnoses     Ureteral stone    -  1      Care Instructions      AFTER YOUR CYSTOSCOPY        You have just completed a cystoscopy, or \"cysto\", which allowed your physician to learn more about your bladder (or to remove a stent placed after surgery). We suggest that you continue to avoid caffeine, fruit juice, and alcohol for the next 24 hours, however, you are encouraged to return to your normal activities.         A few things that are considered normal after your cystoscopy:     * Small amount of bleeding (or spotting) that clears within the next 24 hours     * Slight burning sensation with urination     * Sensation to of needing to avoid more frequently     * The feeling of \"air\" in your urine     * Mild discomfort that is relieved with Tylenol        Please contact our office promptly if you:     * Develop a fever above 101 degrees     * Are unable to urinate     * Develop bright red blood that does not stop     * Severe pain or swelling         Please contact our office with any concerns or questions @[facility].          Follow-ups after your visit        Follow-up notes from your care team     Return in 12 months (on 10/10/2018).      Who to contact     If you have questions or need follow up information about today's clinic visit or your schedule please contact [facility] [place] directly at [phone].  Normal or non-critical lab and imaging results will be communicated to you by MyChart, letter or phone within 4 business days after the clinic has received the results. If you do not hear from us within 7 days, please contact the clinic through the [employer] " "or phone. If you have a critical or abnormal lab result, we will notify you by phone as soon as possible.  Submit refill requests through [employer]ego or call your pharmacy and they will forward the refill request to us. Please allow 3 business days for your refill to be completed.          Additional Information About Your Visit        T[employer] Information     Tego lets you send messages to your doctor, view your test results, renew your prescriptions, schedule appointments and more. To sign up, go to [email or web address] . Click on \"Log in\" on the left side of the screen, which will take you to the Welcome page. Then click on \"Sign up Now\" on the right side of the page.     You will be asked to enter the access code listed below, as well as some personal information. Please follow the directions to create your username and password.     Your access code is: [email or web address]  Expires: 2017  5:53 PM     Your access code will  in 90 days. If you need help or a new code, please call your [facility] clinic or [phone].        Care EveryWhere ID     This is your Care EveryWhere ID. This could be used by other organizations to access your [facility] medical records  [id number]        Your Vitals Were     Height BMI (Body Mass Index)                1.549 m (5' 1\") 29.1 kg/m2           Blood Pressure from Last 3 Encounters:   10/01/17 110/42    Weight from Last 3 Encounters:   10/10/17 69.9 kg (154 lb)   10/01/17 70 kg (154 lb 5.2 oz)              We Performed the Following     CYSTOSCOPY W FOREIGN BODY REMOVAL, SIMPLE (68989)     CYSTOURETHROSCOPY     UA without Microscopic          Today's Medication Changes          These changes are accurate as of: 10/10/17  8:39 AM.  If you have any questions, ask your nurse or doctor.               Start taking these medicines.        Dose/Directions    ciprofloxacin 500 MG tablet   Commonly known as:  CIPRO   Used for:  Ureteral stone   Started by:  Ahmet Lara, " MD        Dose:  500 mg   Take 1 tablet (500 mg) by mouth 2 times daily   Quantity:  1 tablet   Refills:  0            Where to get your medicines      These medications were sent to [facility] [place], [place] - [address]  [address], [place] [address]     Phone:  [phone]     ciprofloxacin 500 MG tablet                Primary Care Provider Office Phone # Fax #    Margy CLAUDIO MD [place] [phone]       [facility] [address] [place] [address]        Equal Access to Services     [facility]: Hadii aad ku hadasho Soomaali, waaxda luqadaha, qaybta kaalmada carlosyakathleen, obed katz . So [facility] [phone].    ATENCIÓN: Si habla español, tiene a merlos disposición servicios gratuitos de asistencia lingüística. [facility] [phone].    We comply with applicable federal civil rights laws and [place] laws. We do not discriminate on the basis of race, color, national origin, age, disability, sex, sexual orientation, or gender identity.            Thank you!     Thank you for choosing [facility] [place]  for your care. Our goal is always to provide you with excellent care. Hearing back from our patients is one way we can continue to improve our services. Please take a few minutes to complete the written survey that you may receive in the mail after your visit with us. Thank you!             Your Updated Medication List - Protect others around you: Learn how to safely use, store and throw away your medicines at www.disposemymeds.org.          This list is accurate as of: 10/10/17  8:39 AM.  Always use your most recent med list.                   Brand Name Dispense Instructions for use Diagnosis    aspirin 81 MG chewable tablet      Take 81 mg by mouth daily        ciprofloxacin 500 MG tablet    CIPRO    1 tablet    Take 1 tablet (500 mg) by mouth 2 times daily    Ureteral stone       SIMVASTATIN  PO      Take 20 mg by mouth At Bedtime

## 2017-10-10 NOTE — PROGRESS NOTES
"This very pleasant 72-year-old lady returns today 1 week after ureteroscopic removal of a left ureteral stone.  This was her first stone.  Stone analysis is consistent with calcium oxalate dihydrate.  She returns today for removal of the stent.  She has no other significant complaints at this time.    Procedure.  Cystoscopy and left stent removal.  Surgeon.   Marco  Anesthesia.  Local anesthesia.  Description.  With the patient in the dorsal lithotomy position, and with the genital area prepped and draped in the cuspid fashion, flexible cystoscope was then passed into the urethra which is normal.  The interior of the bladder is then carefully inspected.  No neoplasm is seen in the bladder.  The stent on the left side is seen, grasped, and removed without difficulty.  There are no other remarkable features.    Impression.  This was the first stone the patient has clinically reported that she is 72 years of age.  I do not therefore see an indication for metabolic stone evaluation.  However I did have a discussion with her about preventative measures which included increased hydration, increasing calcium, citrate and magnesium in the diet and reducing sodium in her diet.  In addition I busted to contact me should severe flank pain or high fever develop.  I discussed the entire situation carefully with the patient in detail today.  I answered all questions.    Plan.  I will see her on a p.r.n. basis.    Time.  10 minutes is spent in addition to the procedure in order to discuss follow-up issues, and to have careful discussion about measures to try and prevent further stones forming in the future.    \"This dictation was performed with voice recognition software and may contain errors,  omissions and inadvertent word substitution.\"    "

## 2017-10-10 NOTE — NURSING NOTE
Chief Complaint   Patient presents with     Cystoscopy     Stent Removal-Calculus of Kidney      Prior to the start of the procedure and with procedural staff participation, I verbally confirmed the patient s identity using two indicators, relevant allergies, that the procedure was appropriate and matched the consent or emergent situation, and that the correct equipment/implants were available. Immediately prior to starting the procedure I conducted the Time Out with the procedural staff and re-confirmed the patient s name, procedure, and site/side. (The Joint Commission universal protocol was followed.)  Yes    Sedation (Moderate or Deep): None    Kyra Cordoba LPN

## 2017-10-10 NOTE — LETTER
"10/10/2017       RE: Marilee Bell  [address] [facility] [address]     Dear Colleague,    Thank you for referring your patient, Marilee Bell, to the [facility] [place] at [facility]. Please see a copy of my visit note below.    This very pleasant 72-year-old lady returns today 1 week after ureteroscopic removal of a left ureteral stone.  This was her first stone.  Stone analysis is consistent with calcium oxalate dihydrate.  She returns today for removal of the stent.  She has no other significant complaints at this time.    Procedure.  Cystoscopy and left stent removal.  Surgeon.   Marco  Anesthesia.  Local anesthesia.  Description.  With the patient in the dorsal lithotomy position, and with the genital area prepped and draped in the cuspid fashion, flexible cystoscope was then passed into the urethra which is normal.  The interior of the bladder is then carefully inspected.  No neoplasm is seen in the bladder.  The stent on the left side is seen, grasped, and removed without difficulty.  There are no other remarkable features.    Impression.  This was the first stone the patient has clinically reported that she is 72 years of age.  I do not therefore see an indication for metabolic stone evaluation.  However I did have a discussion with her about preventative measures which included increased hydration, increasing calcium, citrate and magnesium in the diet and reducing sodium in her diet.  In addition I busted to contact me should severe flank pain or high fever develop.  I discussed the entire situation carefully with the patient in detail today.  I answered all questions.    Plan.  I will see her on a p.r.n. basis.    Time.  10 minutes is spent in addition to the procedure in order to discuss follow-up issues, and to have careful discussion about measures to try and prevent further stones forming in the future.    \"This " "dictation was performed with voice recognition software and may contain errors,  omissions and inadvertent word substitution.\"      Again, thank you for allowing me to participate in the care of your patient.      Sincerely,    Ahmet Lara MD      "

## 2018-10-28 ENCOUNTER — HOSPITAL ENCOUNTER (EMERGENCY)
Facility: CLINIC | Age: 73
Discharge: HOME | End: 2018-10-28
Attending: NURSE PRACTITIONER | Admitting: NURSE PRACTITIONER
Payer: COMMERCIAL

## 2018-10-28 VITALS
OXYGEN SATURATION: 96 % | TEMPERATURE: 97.5 F | RESPIRATION RATE: 16 BRPM | SYSTOLIC BLOOD PRESSURE: 130 MMHG | DIASTOLIC BLOOD PRESSURE: 60 MMHG | HEART RATE: 47 BPM

## 2018-10-28 DIAGNOSIS — R42 VERTIGO: ICD-10-CM

## 2018-10-28 LAB
ANION GAP SERPL CALCULATED.3IONS-SCNC: 7 MMOL/L (ref 3–14)
BUN SERPL-MCNC: 22 MG/DL (ref 7–30)
CALCIUM SERPL-MCNC: 8.9 MG/DL (ref 8.5–10.1)
CHLORIDE SERPL-SCNC: 108 MMOL/L (ref 94–109)
CO2 SERPL-SCNC: 27 MMOL/L (ref 20–32)
CREAT SERPL-MCNC: 0.72 MG/DL (ref 0.52–1.04)
ERYTHROCYTE [DISTWIDTH] IN BLOOD BY AUTOMATED COUNT: 13.1 % (ref 10–15)
GFR SERPL CREATININE-BSD FRML MDRD: 80 ML/MIN/1.7M2
GLUCOSE SERPL-MCNC: 109 MG/DL (ref 70–99)
HCT VFR BLD AUTO: 43.4 % (ref 35–47)
HGB BLD-MCNC: 14.3 G/DL (ref 11.7–15.7)
MCH RBC QN AUTO: 31.3 PG (ref 26.5–33)
MCHC RBC AUTO-ENTMCNC: 32.9 G/DL (ref 31.5–36.5)
MCV RBC AUTO: 95 FL (ref 78–100)
PLATELET # BLD AUTO: 296 10E9/L (ref 150–450)
POTASSIUM SERPL-SCNC: 3.5 MMOL/L (ref 3.4–5.3)
RBC # BLD AUTO: 4.57 10E12/L (ref 3.8–5.2)
SODIUM SERPL-SCNC: 142 MMOL/L (ref 133–144)
TROPONIN I SERPL-MCNC: <0.015 UG/L (ref 0–0.04)
WBC # BLD AUTO: 10.5 10E9/L (ref 4–11)

## 2018-10-28 PROCEDURE — 84484 ASSAY OF TROPONIN QUANT: CPT | Performed by: NURSE PRACTITIONER

## 2018-10-28 PROCEDURE — 25000132 ZZH RX MED GY IP 250 OP 250 PS 637: Performed by: NURSE PRACTITIONER

## 2018-10-28 PROCEDURE — 85027 COMPLETE CBC AUTOMATED: CPT | Performed by: NURSE PRACTITIONER

## 2018-10-28 PROCEDURE — 25000128 H RX IP 250 OP 636: Performed by: NURSE PRACTITIONER

## 2018-10-28 PROCEDURE — 93005 ELECTROCARDIOGRAM TRACING: CPT

## 2018-10-28 PROCEDURE — 80048 BASIC METABOLIC PNL TOTAL CA: CPT | Performed by: NURSE PRACTITIONER

## 2018-10-28 PROCEDURE — 99284 EMERGENCY DEPT VISIT MOD MDM: CPT | Mod: 25

## 2018-10-28 PROCEDURE — 96360 HYDRATION IV INFUSION INIT: CPT

## 2018-10-28 RX ORDER — MECLIZINE HYDROCHLORIDE 25 MG/1
25 TABLET ORAL ONCE
Status: COMPLETED | OUTPATIENT
Start: 2018-10-28 | End: 2018-10-28

## 2018-10-28 RX ORDER — MULTIPLE VITAMINS W/ MINERALS TAB 9MG-400MCG
1 TAB ORAL DAILY
COMMUNITY

## 2018-10-28 RX ADMIN — SODIUM CHLORIDE 1000 ML: 9 INJECTION, SOLUTION INTRAVENOUS at 14:49

## 2018-10-28 RX ADMIN — MECLIZINE HYDROCHLORIDE 25 MG: 25 TABLET ORAL at 14:48

## 2018-10-28 ASSESSMENT — ENCOUNTER SYMPTOMS
SHORTNESS OF BREATH: 0
DIZZINESS: 1
WEAKNESS: 0
VOMITING: 1
NUMBNESS: 0
ABDOMINAL PAIN: 1
NAUSEA: 1
COUGH: 1

## 2018-10-28 NOTE — ED AVS SNAPSHOT
[facility]    [address]    [facility] [address]    Phone:  [phone]    Fax:  [phone]                                       Marilee Bell   MRN: [id number]    Department:  [facility]   Date of Visit:  10/28/2018           After Visit Summary Signature Page     I have received my discharge instructions, and my questions have been answered. I have discussed any challenges I see with this plan with the nurse or doctor.    ..........................................................................................................................................  Patient/Patient Representative Signature      ..........................................................................................................................................  Patient Representative Print Name and Relationship to Patient    ..................................................               ................................................  Date                                   Time    ..........................................................................................................................................  Reviewed by Signature/Title    ...................................................              ..............................................  Date                                               Time          22EPIC Rev 08/18

## 2018-10-28 NOTE — ED TRIAGE NOTES
Dizziness and nausea associated with vomiting most of the day yesterday.  Slept well last night and leaned over to pick something off the floor about 1200 and the symptoms began again.  Symptoms worsen with turning her head worse to the left than right.  Patient alert and oriented x3.  Airway, breathing and circulation intact.

## 2018-10-28 NOTE — ED AVS SNAPSHOT
[facility] Emergency Department    [address] [place] [address]    Phone:  [phone]    Fax:  [phone]                                       Marilee Bell   MRN: [id number]    Department:  [facility] Emergency Department   Date of Visit:  10/28/2018           Patient Information     Date Of Birth          1945        Your diagnoses for this visit were:     Vertigo        You were seen by Gentry Copeland APRN CNP.      Follow-up Information     Follow up with Margy Fletcher MD In 3 days.    Specialty:  Family Practice    Why:  if continuned symptoms or sooner if worsening    Contact information:    [facility]  [address]   [place] [address]  [phone]          Follow up with [facility] Emergency Department.    Specialty:  EMERGENCY MEDICINE    Why:  If symptoms worsen    Contact information:    [address] [place] [id number] [phone]        Discharge Instructions       You may take meclizine as needed as directed 12.5-25 mg 3 times a day for dizziness.  You may get this over-the-counter.      Discharge Instructions  Vertigo  You have been diagnosed with vertigo.  This is a dizzy feeling often described as spinning or that the room is moving around you. You will often have nausea (sick to your stomach), vomiting (throwing up), and balance problems with it.  Vertigo is usually caused by a problem in the inner ear which helps control your balance.  Many things can cause vertigo, including calcium collections in the inner ear, a virus infection of the inner ear, concussion, migraine, and some medicines.  Luckily, these causes are not life threatening and will eventually go away.  However, sometimes there is a serious problem that does not show up right away.  Generally, every Emergency Department visit should have a follow-up clinic visit with either a primary or a specialty  clinic/provider. Please follow-up as instructed by your emergency provider today.  Return to the Emergency Department if you have:    New or severe headache.    Double vision (seeing two of things).    Trouble speaking or hearing.    Weakness or trouble moving/using one side of your body.    Passing out.    Numbness or tingling.    Chest pain.    Vomiting that will not stop.    Treatment:    There are several commonly prescribed medications:  o Antihistamines such as meclizine (Antivert ), dimenhydrinate (Dramamine ), or diphenhydramine (Benadryl ).  o Prescription anti-nausea medicines, such as promethazine (Phenergan ), metoclopramide (Reglan ), or ondansetron (Zofran ).  o Prescription sedative medicines, such as diazepam (Valium ), lorazepam (Ativan ), or clonazepam (Klonopin ).    Most of these medicines make you sleepy, and you should not take them before you work or drive. You should only take prescription medicines to treat severe vertigo symptoms, and you should stop the medicine when your symptoms improve.    Follow Up:    If you have vertigo longer than three days, it is important that you follow up either with your primary provider or an Ear, Nose, and Throat (ENT) specialist.  You may need further testing to evaluate your vertigo and you may also need  vestibular  therapy which is a special form of physical therapy to make the vertigo go away.    If you were given a prescription for medicine here today, be sure to read all of the information (including the package insert) that comes with your prescription.  This will include important information about the medicine, its side effects, and any warnings that you need to know about.  The pharmacist who fills the prescription can provide more information and answer questions you may have about the medicine.  If you have questions or concerns that the pharmacist cannot address, please call or return to the Emergency Department.     Remember that you can  always come back to the Emergency Department if you are not able to see your regular provider in the amount of time listed above, if you get any new symptoms, or if there is anything that worries you.      Your next 10 appointments already scheduled     Dec 10, 2018 10:30 AM CST   Return Visit with Ahmet Lara MD   [facility] ([facility])    [address]  [address]  [facility] [address] [phone]              24 Hour Appointment Hotline       To make an appointment at any [facility], call [phone] ([phone]). If you don't have a family doctor or clinic, we will help you find one. [facility] clinics are conveniently located to serve the needs of you and your family.             Review of your medicines      Our records show that you are taking the medicines listed below. If these are incorrect, please call your family doctor or clinic.        Dose / Directions Last dose taken    aspirin 81 MG chewable tablet   Dose:  81 mg        Take 81 mg by mouth daily   Refills:  0        CALCIUM + D PO        Refills:  0        Multi-vitamin Tabs tablet   Dose:  1 tablet        Take 1 tablet by mouth daily   Refills:  0        SIMVASTATIN PO   Dose:  20 mg        Take 20 mg by mouth At Bedtime   Refills:  0                Procedures and tests performed during your visit     Basic metabolic panel    CBC (platelets, no diff)    Troponin I      Orders Needing Specimen Collection     None      Pending Results     No orders found from 10/26/2018 to 10/29/2018.            Pending Culture Results     No orders found from 10/26/2018 to 10/29/2018.            Pending Results Instructions     If you had any lab results that were not finalized at the time of your Discharge, you can call the ED Lab Result RN at [phone]. You will be contacted by this team for any positive Lab results or changes in treatment. The nurses are available 7 days a week  from 10A to 6:30P.  You can leave a message 24 hours per day and they will return your call.        Test Results From Your Hospital Stay        10/28/2018  3:00 PM      Component Results     Component Value Ref Range & Units Status    WBC 10.5 4.0 - 11.0 10e9/L Final    RBC Count 4.57 3.8 - 5.2 10e12/L Final    Hemoglobin 14.3 11.7 - 15.7 g/dL Final    Hematocrit 43.4 35.0 - 47.0 % Final    MCV 95 78 - 100 fl Final    MCH 31.3 26.5 - 33.0 pg Final    MCHC 32.9 31.5 - 36.5 g/dL Final    RDW 13.1 10.0 - 15.0 % Final    Platelet Count 296 150 - 450 10e9/L Final         10/28/2018  3:23 PM      Component Results     Component Value Ref Range & Units Status    Sodium 142 133 - 144 mmol/L Final    Potassium 3.5 3.4 - 5.3 mmol/L Final    Chloride 108 94 - 109 mmol/L Final    Carbon Dioxide 27 20 - 32 mmol/L Final    Anion Gap 7 3 - 14 mmol/L Final    Glucose 109 (H) 70 - 99 mg/dL Final    Urea Nitrogen 22 7 - 30 mg/dL Final    Creatinine 0.72 0.52 - 1.04 mg/dL Final    GFR Estimate 80 >60 mL/min/1.7m2 Final    Non  GFR Calc    GFR Estimate If Black >90 >60 mL/min/1.7m2 Final    African American GFR Calc    Calcium 8.9 8.5 - 10.1 mg/dL Final         10/28/2018  3:23 PM      Component Results     Component Value Ref Range & Units Status    Troponin I ES <0.015 0.000 - 0.045 ug/L Final    The 99th percentile for upper reference range is 0.045 ug/L.  Troponin values   in the range of 0.045 - 0.120 ug/L may be associated with risks of adverse   clinical events.                  Clinical Quality Measure: Blood Pressure Screening     Your blood pressure was checked while you were in the emergency department today. The last reading we obtained was  BP: 128/55 . Please read the guidelines below about what these numbers mean and what you should do about them.  If your systolic blood pressure (the top number) is less than 120 and your diastolic blood pressure (the bottom number) is less than 80, then your blood  "pressure is normal. There is nothing more that you need to do about it.  If your systolic blood pressure (the top number) is 120-139 or your diastolic blood pressure (the bottom number) is 80-89, your blood pressure may be higher than it should be. You should have your blood pressure rechecked within a year by a primary care provider.  If your systolic blood pressure (the top number) is 140 or greater or your diastolic blood pressure (the bottom number) is 90 or greater, you may have high blood pressure. High blood pressure is treatable, but if left untreated over time it can put you at risk for heart attack, stroke, or kidney failure. You should have your blood pressure rechecked by a primary care provider within the next 4 weeks.  If your provider in the emergency department today gave you specific instructions to follow-up with your doctor or provider even sooner than that, you should follow that instruction and not wait for up to 4 weeks for your follow-up visit.        Thank you for choosing [facility]       Thank you for choosing [facility] for your care. Our goal is always to provide you with excellent care. Hearing back from our patients is one way we can continue to improve our services. Please take a few minutes to complete the written survey that you may receive in the mail after you visit with us. Thank you!        [employer] Information     [employer] lets you send messages to your doctor, view your test results, renew your prescriptions, schedule appointments and more. To sign up, go to [email or web address] . Click on \"Log in\" on the left side of the screen, which will take you to the Welcome page. Then click on \"Sign up Now\" on the right side of the page.     You will be asked to enter the access code listed below, as well as some personal information. Please follow the directions to create your username and password.     Your access code is: [email or web address]  Expires: 1/26/2019  4:16 PM     Your access code " will  in 90 days. If you need help or a new code, please call your [facility] [place] clinic or [phone].        Care EveryWhere ID     This is your Care EveryWhere ID. This could be used by other organizations to access your [facility] medical records  [id number]        Equal Access to Services     JULISSA BUTLER : Elver green Somiranda, waaxda luqadaha, qaybta kaalmada leni, obed caldera. So [facility] [phone].    ATENCIÓN: Si habla español, tiene a merlos disposición servicios gratuitos de asistencia lingüística. Llame al [phone].    We comply with applicable federal civil rights laws and [place] laws. We do not discriminate on the basis of race, color, national origin, age, disability, sex, sexual orientation, or gender identity.            After Visit Summary       This is your record. Keep this with you and show to your community pharmacist(s) and doctor(s) at your next visit.

## 2018-10-28 NOTE — DISCHARGE INSTRUCTIONS
You may take meclizine as needed as directed 12.5-25 mg 3 times a day for dizziness.  You may get this over-the-counter.      Discharge Instructions  Vertigo  You have been diagnosed with vertigo.  This is a dizzy feeling often described as spinning or that the room is moving around you. You will often have nausea (sick to your stomach), vomiting (throwing up), and balance problems with it.  Vertigo is usually caused by a problem in the inner ear which helps control your balance.  Many things can cause vertigo, including calcium collections in the inner ear, a virus infection of the inner ear, concussion, migraine, and some medicines.  Luckily, these causes are not life threatening and will eventually go away.  However, sometimes there is a serious problem that does not show up right away.  Generally, every Emergency Department visit should have a follow-up clinic visit with either a primary or a specialty clinic/provider. Please follow-up as instructed by your emergency provider today.  Return to the Emergency Department if you have:    New or severe headache.    Double vision (seeing two of things).    Trouble speaking or hearing.    Weakness or trouble moving/using one side of your body.    Passing out.    Numbness or tingling.    Chest pain.    Vomiting that will not stop.    Treatment:    There are several commonly prescribed medications:  o Antihistamines such as meclizine (Antivert ), dimenhydrinate (Dramamine ), or diphenhydramine (Benadryl ).  o Prescription anti-nausea medicines, such as promethazine (Phenergan ), metoclopramide (Reglan ), or ondansetron (Zofran ).  o Prescription sedative medicines, such as diazepam (Valium ), lorazepam (Ativan ), or clonazepam (Klonopin ).    Most of these medicines make you sleepy, and you should not take them before you work or drive. You should only take prescription medicines to treat severe vertigo symptoms, and you should stop the medicine when your symptoms  improve.    Follow Up:    If you have vertigo longer than three days, it is important that you follow up either with your primary provider or an Ear, Nose, and Throat (ENT) specialist.  You may need further testing to evaluate your vertigo and you may also need  vestibular  therapy which is a special form of physical therapy to make the vertigo go away.    If you were given a prescription for medicine here today, be sure to read all of the information (including the package insert) that comes with your prescription.  This will include important information about the medicine, its side effects, and any warnings that you need to know about.  The pharmacist who fills the prescription can provide more information and answer questions you may have about the medicine.  If you have questions or concerns that the pharmacist cannot address, please call or return to the Emergency Department.     Remember that you can always come back to the Emergency Department if you are not able to see your regular provider in the amount of time listed above, if you get any new symptoms, or if there is anything that worries you.

## 2018-10-28 NOTE — ED PROVIDER NOTES
History     Chief Complaint:  Dizziness    HPI   Marilee Bell is a 73 year old female who presents to the emergency department for evaluation of dizziness. She reports that she felt dizzy yesterday morning and for the majority of the day. She reports associated nausea and vomiting as well, however after she took some sleep medication for sleep, her symptoms were relieved last night. This morning, she reports that she woke up feeling asymptomatic, but when she bent over to pick something up, around noon, her symptoms came flooding back which prompted this visit to the ED. Here, she reports that movement, particularly rolling in bed, may have exacerbated her symptoms yesterday. Moving her head to the side also exacerbates her symptoms, and it is worse when she turns to the left. She denies any chest pain, shortness of breath, extremity numbness, weakness, or congestion. Additionally, she reports a prior history of vertigo, but says she has not had an episode in a while.     Allergies:  Sulfa     Medications:    Aspirin  Simvastatin    Past Medical History:    The patient denies any significant past medical history.    Past Surgical History:    Laser holmium lithotripsy ureter stent    Family History:    Nephrolithiasis    Social History:  Marital Status:   [2]  Presents with .   Negative for tobacco use.     Review of Systems   HENT: Negative for congestion.    Respiratory: Positive for cough. Negative for shortness of breath.    Cardiovascular: Negative for chest pain.   Gastrointestinal: Positive for abdominal pain, nausea and vomiting.   Neurological: Positive for dizziness. Negative for weakness and numbness.   All other systems reviewed and are negative.      Physical Exam     Patient Vitals for the past 24 hrs:   BP Temp Temp src Heart Rate SpO2   10/28/18 1515 128/55 - - 55 99 %   10/28/18 1500 136/61 - - (!) 43 99 %   10/28/18 1445 134/65 - - (!) 48 100 %   10/28/18 1430 - - - 50 (!) 67 %    10/28/18 1429 146/75 97.5  F (36.4  C) Oral (!) 47 99 %     Physical Exam  General: Alert, Mild discomfort, well kept  Eyes: PERRL, conjunctivae pink no scleral icterus or conjunctival injection  ENT:   Moist mucus membranes, posterior oropharynx clear without erythema or exudates, No lymphadenopathy, Normal voice. Turning her head to the left exacerbated her symptoms.   Resp:  Lungs clear to auscultation bilaterally, no crackles/rubs/wheezes. Good air movement  CV:  Normal rate and rhythm, no murmurs/rubs/gallops  GI:  Abdomen soft and non-distended.  Normoactive BS.  No tenderness, guarding or rebound, Yes masses  Skin:  Warm, dry.  No rashes or petechiae  Musculoskeletal: No peripheral edema or calf tenderness, Normal gross ROM   Neuro: Alert and oriented to person/place/time, normal sensation  Psychiatric: Normal affect, cooperative, good eye contact    Emergency Department Course   ECG:  Indication: Dizziness  Time: 1417  Vent. Rate 49 bpm. MA interval 160. QRS duration 94. QT/QTc 476/429. P-R-T axis 62 -53 34.  Sinus bradycardia. Left anterior fascicular block. Septal infarct, age undetermined. Abnormal ECG. Read and signed by Dr. Neal at time: 1425.    Laboratory:  CBC: WBC: 10.5, HGB: 14.3, PLT: 296  BMP: Glucose 109 (H), o/w WNL (Creatinine: 0.72)    1447 Troponin: <0.015    Interventions:  1448 Antivert, 25 mg, PO   NS 1L IV    Emergency Department Course:  Nursing notes and vitals reviewed. (1430) I performed an exam of the patient as documented above.      IV inserted. Medicine administered as documented above. Blood drawn. This was sent to the lab for further testing, results above.     EKG obtained in the ED, see results above.      (1605) I rechecked the patient and discussed the results of her workup thus far. She is no longer dizzy, and will have a road test prior to a disposition decision.     (1613) Per nursing, the patient performed well on the road test and was not dizzy.      Findings and  plan explained to the Patient. Patient discharged home with instructions regarding supportive care, medications, and reasons to return. The importance of close follow-up was reviewed. The patient was prescribed no medications, but I recommended she use meclizine OTC.      I personally reviewed the laboratory results with the Patient and answered all related questions prior to discharge.      Impression & Plan      Medical Decision Making:  Marilee Bell is a 73 year old female who presents for evaluation of vertigo. The differential diagnosis of vertigo is broad and includes common etiologies such as menieres disease, labyrinthitis, benign positional vertigo, otitis media, etc. More serious etiologies considered include central etiologies such as tumor, intracerebral bleed, dissection, ischemic cerebral vascular accident. The history, physical exam including detailed neurologic exam, and workup in the emergency room suggests a benign cause of vertigo today. Patient feels improved after interventions noted above. Further outpatient management is indicated with vertigo medications.  She has history of Bradycardia and this is not likely involved in her symptoms. Follow up with PCP in 2-3 days if continued or worsening symptoms.  Return to ER if increased symptoms, including but not limited to visual changes, speech difficulty, and weakness, or for other concerns.    No indication for advanced imaging at this point (CT/MRI) as there are no definite signs of a central and concerning etiology for the vertigo. Vertigo precautions given for home.     Critical Care time:  none    Diagnosis:    ICD-10-CM    1. Vertigo R42        Disposition: discharged to home    Scribe Disclosure:  I, Rosita Joseph, am serving as a scribe on 10/28/2018 at 2:30 PM to personally document services performed by Gentry Copeland APRN, CNP based on my observations and the provider's statements to me.     Rosita Joseph  10/28/2018   [facility] EMERGENCY DEPARTMENT       Gentry Copeland, APRN CNP  10/28/18 1485

## 2018-10-29 LAB — INTERPRETATION ECG - MUSE: NORMAL

## 2018-12-06 DIAGNOSIS — N20.0 KIDNEY STONE: Primary | ICD-10-CM

## 2018-12-10 ENCOUNTER — OFFICE VISIT (OUTPATIENT)
Dept: UROLOGY | Facility: CLINIC | Age: 73
End: 2018-12-10
Payer: COMMERCIAL

## 2018-12-10 VITALS — SYSTOLIC BLOOD PRESSURE: 124 MMHG | HEART RATE: 72 BPM | DIASTOLIC BLOOD PRESSURE: 66 MMHG

## 2018-12-10 DIAGNOSIS — Z87.442 HISTORY OF RENAL STONE: Primary | ICD-10-CM

## 2018-12-10 PROCEDURE — 99213 OFFICE O/P EST LOW 20 MIN: CPT | Performed by: UROLOGY

## 2018-12-10 ASSESSMENT — PAIN SCALES - GENERAL: PAINLEVEL: NO PAIN (0)

## 2018-12-10 NOTE — LETTER
12/10/2018       RE: Marilee Bell  [address] [facility] [address]     Dear Colleague,    Thank you for referring your patient, Marilee Bell, to the [facility] [place] at [facility]. Please see a copy of my visit note below.    This very pleasant 73-year-old lady returns for follow-up consultation today.  As we recall she does have a previous history of urinary stone disease, had severe left ureteric colic 12 months ago, and had a left ureteroscopy with removal of the stone.  Calculi composed primarily of   calcium oxalate dihydrate.   INTERPRETIVE INFORMATION: Calculi (Stone) analysis   Calculi are the products of physiological processes that   yield crystalline compounds in a matrix of biological   compounds and blood.  Matrix components are not reported.     The clinically significant crystalline components   identified in calculi specimens are reported.  Gross   description may not be consistent with composition   determined by FTIR analysis.   Performed by [facility],   [address] [place] [id number] [phone]   [email or web address], Bill Leon MD, Lab. Director     She has had no symptoms since this time.    I have carefully reviewed the previous CT scan  CT ABDOMEN AND PELVIS WITH CONTRAST 10/1/2017 9:18 AM      HISTORY: Left-sided abd pain and vomiting, history of diverticulitis.     COMPARISON: None.     TECHNIQUE: Volumetric helical acquisition of CT images from the lung  bases through the symphysis pubis after the administration of 75mL  Isovue-370  intravenous contrast. Radiation dose for this scan was  reduced using automated exposure control, adjustment of the mA and/or  kV according to patient size, or iterative reconstruction technique.     FINDINGS: There is a 5 mm stone at the left ureterovesicular junction  with moderate proximal hydronephrosis. Mild-moderate perinephric  stranding and fluid. At least  three nonobstructing stones are seen in  the left kidney measuring up to 4 mm. An upper pole stone on the right  measures 2 mm. No right ureteral stones. There is moderate  diverticulosis without evidence for diverticulitis. There are no  dilated loops of small intestine or large bowel to suggest ileus or  obstruction. No free air or free fluid. The liver, spleen, adrenal  glands, kidneys, and pancreas demonstrate no worrisome focal lesion.  There are moderate atherosclerotic changes of the visualized aorta and  its branches. There is no evidence of aortic dissection or aneurysm.  Gallbladder unremarkable. Trace peripheral fibrosis and/or atelectasis  at the lung bases. Survey of the visualized bony structures  demonstrates no destructive bony lesions.                                                                      IMPRESSION:  1. 5 mm stone at the left ureterovesicular junction with mild proximal  hydronephrosis and stranding.  2. Bilateral nonobstructing stones.        I went over the previous scan with her carefully today because there are small stones also located in both kidneys which are small and in peripheral calyces.  This was her first stone causing symptoms.  She is 73 years of age.  I had a careful discussion with her about measures to prevent further stones forming which include the following.  Increasing intake of fluid.  Reducing sodium in the diet.  Increase in calcium, citrate and magnesium in the diet.  Eating red meat in moderation.    I do not see an indication for metabolic stone evaluation as this was her first symptomatic stone.  If she does have further issues with further symptomatic stones however we would refer her to a nephrologist.  I have instructed her to contact me or come to the emergency room should severe ureteric colic occurred again.  I did discuss the entire situation with the patient in detail today.  I answered all the questions.    Plan.  I will see her on a as needed  "basis.    Time.  15 minutes with greater than 50% in discussion and consultation.    \"This dictation was performed with voice recognition software and may contain errors,  omissions and inadvertent word substitution.\"    AYDE RODRIGUEZ MD    Again, thank you for allowing me to participate in the care of your patient.      Sincerely,    Ayde Lara MD      "

## 2018-12-10 NOTE — PROGRESS NOTES
This very pleasant 73-year-old lady returns for follow-up consultation today.  As we recall she does have a previous history of urinary stone disease, had severe left ureteric colic 12 months ago, and had a left ureteroscopy with removal of the stone.  Calculi composed primarily of   calcium oxalate dihydrate.   INTERPRETIVE INFORMATION: Calculi (Stone) analysis   Calculi are the products of physiological processes that   yield crystalline compounds in a matrix of biological   compounds and blood.  Matrix components are not reported.     The clinically significant crystalline components   identified in calculi specimens are reported.  Gross   description may not be consistent with composition   determined by FTIR analysis.   Performed by [facility],   [address] [facility] [address] [phone]   [email or web address], Bill Leon MD, Lab. Director     She has had no symptoms since this time.    I have carefully reviewed the previous CT scan  CT ABDOMEN AND PELVIS WITH CONTRAST 10/1/2017 9:18 AM      HISTORY: Left-sided abd pain and vomiting, history of diverticulitis.     COMPARISON: None.     TECHNIQUE: Volumetric helical acquisition of CT images from the lung  bases through the symphysis pubis after the administration of 75mL  Isovue-370  intravenous contrast. Radiation dose for this scan was  reduced using automated exposure control, adjustment of the mA and/or  kV according to patient size, or iterative reconstruction technique.     FINDINGS: There is a 5 mm stone at the left ureterovesicular junction  with moderate proximal hydronephrosis. Mild-moderate perinephric  stranding and fluid. At least three nonobstructing stones are seen in  the left kidney measuring up to 4 mm. An upper pole stone on the right  measures 2 mm. No right ureteral stones. There is moderate  diverticulosis without evidence for diverticulitis. There are no  dilated loops of small intestine or large bowel to suggest ileus  "or  obstruction. No free air or free fluid. The liver, spleen, adrenal  glands, kidneys, and pancreas demonstrate no worrisome focal lesion.  There are moderate atherosclerotic changes of the visualized aorta and  its branches. There is no evidence of aortic dissection or aneurysm.  Gallbladder unremarkable. Trace peripheral fibrosis and/or atelectasis  at the lung bases. Survey of the visualized bony structures  demonstrates no destructive bony lesions.                                                                      IMPRESSION:  1. 5 mm stone at the left ureterovesicular junction with mild proximal  hydronephrosis and stranding.  2. Bilateral nonobstructing stones.        I went over the previous scan with her carefully today because there are small stones also located in both kidneys which are small and in peripheral calyces.  This was her first stone causing symptoms.  She is 73 years of age.  I had a careful discussion with her about measures to prevent further stones forming which include the following.  Increasing intake of fluid.  Reducing sodium in the diet.  Increase in calcium, citrate and magnesium in the diet.  Eating red meat in moderation.    I do not see an indication for metabolic stone evaluation as this was her first symptomatic stone.  If she does have further issues with further symptomatic stones however we would refer her to a nephrologist.  I have instructed her to contact me or come to the emergency room should severe ureteric colic occurred again.  I did discuss the entire situation with the patient in detail today.  I answered all the questions.    Plan.  I will see her on a as needed basis.    Time.  15 minutes with greater than 50% in discussion and consultation.    \"This dictation was performed with voice recognition software and may contain errors,  omissions and inadvertent word substitution.\"    AYDE RODRIGUEZ MD    "

## 2020-09-28 ENCOUNTER — TELEPHONE (OUTPATIENT)
Dept: NEUROPSYCHOLOGY | Facility: CLINIC | Age: 75
End: 2020-09-28

## 2020-11-10 ENCOUNTER — VIRTUAL VISIT (OUTPATIENT)
Dept: NEUROPSYCHOLOGY | Facility: CLINIC | Age: 75
End: 2020-11-10
Payer: COMMERCIAL

## 2020-11-10 DIAGNOSIS — R41.9 NEUROCOGNITIVE DISORDER: ICD-10-CM

## 2020-11-10 DIAGNOSIS — G31.84 MCI (MILD COGNITIVE IMPAIRMENT): Primary | ICD-10-CM

## 2020-11-10 DIAGNOSIS — R52 PAIN: ICD-10-CM

## 2020-11-10 PROCEDURE — 96132 NRPSYC TST EVAL PHYS/QHP 1ST: CPT | Mod: 95 | Performed by: PSYCHOLOGIST

## 2020-11-10 PROCEDURE — 96138 PSYCL/NRPSYC TECH 1ST: CPT | Mod: 95 | Performed by: PSYCHOLOGIST

## 2020-11-10 PROCEDURE — 96133 NRPSYC TST EVAL PHYS/QHP EA: CPT | Mod: 95 | Performed by: PSYCHOLOGIST

## 2020-11-10 PROCEDURE — 90791 PSYCH DIAGNOSTIC EVALUATION: CPT | Mod: 95 | Performed by: PSYCHOLOGIST

## 2020-11-10 PROCEDURE — 96139 PSYCL/NRPSYC TST TECH EA: CPT | Mod: 95 | Performed by: PSYCHOLOGIST

## 2020-11-10 NOTE — PROGRESS NOTES
Face Sheet          Patient:  Marilee Bell MRN:  [id number] :  [birth year] CIFUENTES:  11/10/20   Education: 16 Handedness:  RH Provider: BRADLEY Psychometrist:  MADDISON   Station: OP Age: 75 Visit Type: (tel/vid) Video Platform (if video) Zoom   ORIENTATION  WRAT-4  WAIS-IV Raw SS RDS   Personal Info 4 Raw 65 Digit Span 21 9 8   Place 1  Vocabulary 44 12    Time 0 %ile 82 Matrix Reasoning 13 11 Est VIQ   Presidents 2 Grade Equivalence >12.9 Similarities 24 11 108   BOSTON NAMING TEST  COWAT  ANIMAL FLUENCY      Raw 50 Form FAS Raw 18     SS 7 Raw 23 SS 9     T 41 SS 6 T 47     Total Stim Correct 0 T 33       Total Phonemic Correct 7         COMPLEX IDEATIONAL MATERIAL CLOCK DRAWING  RBANS LINE ORIENTATION     Raw 12 Command 3/3 Raw 18     SS 12 Copy 3/3 z 0.57     T 54   %ile 51-75     ORAL TRAILS    TSAT      Trails A 7 Z -0.01 Total time 83 Z 0.35   Trails B 21 Z 1.11 Total Errors 0 Z 0.92   HVLT           Trial 1 4   T-Score   T-Score   Trial 2 5 Total Recall 16 36 True Positives 9    Trial 3 7 Delayed Recall 3 31 False Positives 0    Learning 3 Percent Retention 43% 29 Discrim. Index 9 43   RBANS STORY           Total Immediate 18 z Score 0.17 Scaled Score 11     Total Delay 3 z Score -2.73 Scaled Score 4     ILS HEALTH AND SAFETY QUESTIONNAIRE         Total 38 Classification High       MIGUEL          Total 4 Interpretation Minimal       GDS          Total 6 Interpretation Minimal

## 2020-11-10 NOTE — PROGRESS NOTES
The patient was seen for neuropsychological evaluation via telehealth at the request of Margy Fletcher MD for the purposes of diagnostic clarification and treatment planning.  110 minutes of video test administration and scoring were provided by this writer.  Please see Dr. Weston Krishna's report for a full interpretation of the findings.    Video Start Time 1: 8:05AM  Video End Time 1: 8:09AM    Video Start Time 2: 8:40AM  Video End Time 2: 9:57AM    Carina Harry  Psychometrist

## 2020-11-10 NOTE — LETTER
"11/10/2020       RE: Marilee Bell  [address] [place] [address]     Marilee Bell is a 75 year old female who is being evaluated via a billable video visit.      The patient has been notified of following:     \"This video visit will be conducted via a call between you and your physician/provider. We have found that certain health care needs can be provided without the need for an in-person physical exam.  This service lets us provide the care you need with a video conversation.  Video visits are billed at different rates depending on your insurance coverage.  Please reach out to your insurance provider with any questions.    If during the course of the call the physician/provider feels a video visit is not appropriate, you will not be charged for this service.\"    Patient has given verbal consent for Video visit? Yes    If you are dropped from the video visit, the video invite should be resent to: Send to e-mail at: [email or web address]  Will anyone else be joining your video visit? No      Video-Visit Details    Type of service:  Video Visit  Interview:  Video Start Time: 8:10 AM  Video End Time: 8:36 AM    Originating Location (pt. Location): Home    Distant Location (provider location):  [facility] NEUROPSYCHOLOGY [place]     Platform used for Video Visit: Zoom (NOTE: [employer] experienced technical difficulties so the video platform was switched to zoom.)    RE: Marilee Bell  MR#: [id number]  : 1945  DOS: Nov 10, 2020  WILLY:  November 10, 2020    Neuropsychology Laboratory  [address] [address] [phone]    NEUROPSYCHOLOGICAL CONSULTATION      REASON FOR REFERRAL:  Marilee Bell is a 75 year old female with 16 years of education who was referred for a neuropsychological evaluation by Dr. Anabelle Torres to assess her cognitive and emotional functioning secondary to memory changes. The evaluation was requested in " order to document her current level of functioning, assist with the differential diagnosis and provide appropriate recommendations to the patient, family and treatment team. The patient was informed that the evaluation included multiple measures of performance and symptom validity, and she was encouraged to provide her best effort at all times.  The nature of the neuropsychological evaluation was also discussed, including limits of confidentiality (for suspected child or elder abuse, potential homicide or suicide, and court orders). The patient was also informed that the report would be placed on the electronic medical records system. The patient was also given an opportunity to ask questions. The patient indicated that she understood the information and consented to participate in the evaluation.    Due to circumstances that prevent in-person clinical visits, this assessment was conducted using telehealth methods (including remote audiovisual presentation of test instructions and test stimuli). The standard administration of these procedures involves in-person, face-to-face methods. The impact of applying non-standard administration methods has been evaluated only in part by scientific research. While every effort was made to simulate standard assessment practices, the diagnostic conclusions and recommendations for treatment provided in this report are being advanced with these reservations.    PROCEDURES:   Review of records and clinical interview  Mental Status-orientation, Wide Range Achievement Test-4, Wechsler Adult Intelligence Scale-IV, Guzman Verbal Learning Test-Revised, Clock Drawing Test, Verbal Fluency Test, Oral Trailmaking Test, BDAE Complex Ideational Material, Test of Sustained Attention and Tracking, [place] Naming Test (15 Item version), RBANS, Geriatric Depression Scale, Pepper Anxiety Inventory and ILS Health and Safety Questions    REVIEW OF RECORDS: Records from 9/11/2020 indicated the referring  physician was concerned with memory changes for the patient. The records indicated that the patient  lives with her  in their home. Her children and  have noticed that she is having memory changes for about a year. Apparently she forgets conversations, can't remember why she went into a different room, etc. All short term memory problems. Pt and  share the duties of cooking, doing finances, hskg. No problems. Pt sets up her meds and believes she takes them regularly. She does drive. However, she has voiced that she is more anxious when going to new places or during high traffic times. We discussed this and recommended that she avoids those situations and has her  or family drive her when this happens. Pt scored 24/30 on MOCA. She did not remember any of the recall words and could not name 11 or more words that begin with F. Pt does not feel there is an issue but dtr does want pt to pursue neuropsych testing.     Records from 10/28/2018 noted that she was seen in the emergency department for dizziness with associated nausea and vomiting.  The records indicated she was being treated with aspirin, vitamin D plus calcium, multivitamin, and simvastatin at that time and she denied significant medical history.  Neurosurgery records from 8/20/2020 noted diagnoses of Lumbar stenosis with neurogenic claudication, Stenosis of lateral recess of lumbar spine, Neuroforaminal stenosis of lumbar spine, Spondylolisthesis of lumbar region, Low back pain radiating to both legs, Lumbar disc herniation, and Facet arthropathy.    CLINICAL INTERVIEW: The patient was interviewed via video platform for this telehealth neuropsychological evaluation.  The patient's , Manjit, and daughter, Avril, were also present.  On interview, the patient reported difficulty with her short-term memory.  She noted that she has difficulty in recalling new information, and sometimes needs to write down the information to  "recall it later.  She denied difficulty with long-term memory.  Her  confirmed this information, and noted that sometimes she does not recall information from the conversation.  The patient's daughter also confirmed concerns with the patient's memory, and indicated that they have observed the patient's memory problems over approximately the past year.  The patient's daughter also noted that the patient's memory difficulties have been getting gradually worse over time.  The patient also noted occasional word finding difficulties, and stated that sometimes it takes her longer to come up with a word than previously.  The patient and her  denied significant problems with decision-making or problem-solving for the patient.  In terms of attention/concentration, the patient stated that sometimes she has difficulty with concentration while reading, but when she is completing physical activities like chores she does not have any difficulty.  The patient's  confirmed this.  Functionally, the patient reported that she is driving, but has curtailed her driving.  Her  said she has not had any accidents or tickets, but she has become confused with directions to get home.  In terms of financial management, the patient reported that her  has always done this and this is not a change.  Her  stated that she helps and is able to participate in household financial decision making.  The patient denied difficulty with medication management or basic activities of daily living such as cooking, cleaning, and yard work.    The patient reported that her mood has \"lately been not so good\" due to Covid-19 restrictions.  She noted that engaging in activities like walking her dog helps her mood.  The patient's  indicated that he was not particularly concerned about her mood.  The patient also denied difficulty with anxiety.  However, the patient reported that her sleeping is poor due to significant " "back pain.  She said that she will sleep about 5 to 6 hours per night, and that her sleep that is interrupted by the pain.  Her  noted she also takes naps during the day.  The patient denied difficulty with appetite.  The patient also denied any previous contact with mental health professionals.  She denied any current or previous suicidal or homicidal ideation, and denied any history of suicide attempts.  The patient also denied any hallucinations or delusions.  The patient and her  noted that she will occasionally drink wine with dinner, but this is not a nightly occurrence.  The patient denied any use of tobacco or illicit substances.    Medically, the patient noted difficulty with her low back pain, which is consistent with the records.  She reported the pain is not constant, but when she has pain, it is between a 6 and 7 on a 1-10 scale, with 10 being the most severe.  She also noted that sometimes her pain gets as severe as an 8 on the scale, which usually occurs when she has been active.  The patient noted that she is taking medication for high cholesterol, but her physician has said she could stop this medication.  The patient denied any history of traumatic brain injury with loss of consciousness.  She also denied any history multiple sclerosis, strokes, seizures, movement disorders, sleep apnea, hypothyroidism, diabetes, heart disease, cancer, hypertension, liver disease, or kidney disease.  She stated that her mother  of a stroke and her father  with cardiac arrest.  The patient reported that she wears eyeglasses primarily for reading.  She denied any history of eye diseases or hearing problems.  They also noted that her medication list is currently up-to-date.    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  She stated that she was a \"pretty good\" student in school, and received primarily A's and " B's.  The patient and her  noted they have been  for 54 years and have 2 children, a son and a daughter.  The patient noted that she lives at home with her , and is retired.    BEHAVIORAL OBSERVATIONS: On examination, the patient was casually but appropriately dressed and groomed. The patient was cooperative with the evaluation and was talkative. The patient appeared to put forth her best effort on all tasks. Thus, the results of this evaluation are a reasonably valid reflection of the patient s current level of functioning. There were no indications of hallucinations, delusions or unusual thought processes. The patient was generally oriented and her affect was appropriate.  At times, the patient struggled with remembering details of her history during the interview.    RESULTS: Formal performance validity measures were within expected limits and consistent with the above-noted observations.  Psychometric estimates of the patient's premorbid intellectual functioning based upon single word reading ability were in the high average range, which is generally consistent with her educational and occupational history.  Likewise, verbally mediated intellectual functioning was in the upper half of the average range.  Thus, there was no evidence for significant change or decline in global cognitive functioning.    Measures of attention/concentration and processing speed were generally within expected limits.  For example, a digit span task was in the average range.  Likewise, an oral sequencing task that integrates attention was within expected limits.  Similarly, a timed measure of attention and processing speed was within expectancy.    Measures of the patient's verbal memory functioning indicated evidence for mild deficits.  Specifically, there was evidence for variability in encoding of new information, with the patient performing better on a more structured story memory task that was within expected  limits.  In contrast, a less structured word list learning task indicated evidence for mild inefficiency in encoding of new verbal information.  Further, there was evidence for mild but significant deficits with delayed recall, indicating evidence for mild deficits with retrieval of previously learned verbal information.  A recognition format was mildly helpful for the patient.  Visual memory functioning could not be assessed due to the telehealth platform for this evaluation.    Expressive language functioning was variable.  For example, vocabulary was in the upper half of the average range.  However, confrontation naming was at the lower end of expected limits.  Phonemic fluency was in the mildly impaired range as well.  Semantic fluency was better and within expected limits.  Receptive language functioning, as assessed by a complex ideational material task, was within expected limits and better preserved.    Visual-spatial and visual constructional abilities were generally within expected limits.  For example, motor-free visual reasoning was in the average range.  Further, motor-free line orientation judgment was within expected limits.  Additionally, a clock drawing task indicated no evidence for significant visual-spatial distortions.    Measures of the patient's executive functioning were generally within expected limits.  For example, verbal and visual reasoning was in the average range.  Further, a complex oral sequencing task that integrates cognitive flexibility was within expected limits.  A clock drawing task did not indicate evidence for significant planning and organizational difficulties.  While verbal fluency was mildly variable, a functional measure of basic health and safety awareness was within expected limits.    Assessment of the patient's emotional functioning was completed utilizing the clinical interview and self-report measures of depression and anxiety.  On the self-report measures, the  patient denied clinically significant symptoms of depression and anxiety, which is generally consistent with her report during the interview.    SUMMARY:  The following opinions and recommendations are based on the interview and formal testing data obtained via telephone, thus all results were interpreted with caution.  In summary, the neuropsychological assessment results indicated evidence from mild deficits primarily with retrieval of previously learned verbal information.  Encoding of new verbal information was variable, but the patient performed better on more structured verbal memory tasks.  A recognition format improved her performance, indicating no consistent evidence for rapid forgetting of previously learned verbal information.  Expressive language functioning was variable, while receptive language functioning, executive functioning, and visual-spatial abilities were within expected limits.  Attention and processing speed were also within expectancy, and there was no evidence for significant change or decline in global cognitive functioning.  The patient and her  also did not endorse significant limitations in her functional abilities.  Thus, the results indicated that the patient met criteria for a mild cognitive impairment (MCI).  The specific etiology of her cognitive difficulties could not be completely determined at this time.  However, factors such as pain and sleep difficulties are likely contributing.  Neurologically based cognitive difficulties could not be completely ruled out based on this evaluation alone.    RECOMMENDATIONS:   1.  Given these results, a referral for neurological consultation is recommended.  Further, referral for neuroimaging scans, such as an MRI scan of the brain, would be helpful in clarifying the differential diagnosis.  2. Given her difficulties with back pain, a referral for psychotherapeutic intervention is also recommended. A highly structured  cognitive-behavioral intervention focused on coping and stress management would likely be the most beneficial. Referral to a psychologist who specializes in chronic pain would be particularly helpful. One option for this service is through the [facility] at [email or web address] or [phone].  3. In order to promote learning and memorization of new verbal material, it is recommended that the patient add structure to the material she is learning to promote subsequent recall (e.g., use mnemonic devices, acronyms, make up a story or song). Additionally, she is encouraged to use visual aids, such as flash cards, diagrams, charts, etc.   4. The patient may find the following attention and organizational strategies helpful:     Use cell phone reminders to help monitor upcoming appointments and due dates as well as other important tasks (e.g., when to take medications). Set the reminder to go off several times prior to the event with advanced notice (e.g., one week before, two days before, the day before, and the morning of the event).     She should attempt to reduce distractions at home. Having a clutter-free work environment and removing or at least making it harder to access potential distractions would help optimize his attention. He might also consider facing away from high traffic areas and using earplugs or noise cancellation headphones when desiring a quiet work environment.    Taking short breaks during her day may help optimize and maintain her concentration.     Make to-do lists and prioritize tasks. Break down large tasks into smaller steps and check off each small step when completed.   5. Chronic pain can interfere with cognitive functioning in daily life. The following strategies can be helpful in minimizing the impact of pain on concentration and memory:    Get an adequate amount of restorative sleep each night     Practice mindfulness  "exercises often      Maintain a schedule that allows for sufficient time to engage in pleasurable activities.     A good self-help resource for managing pain and instruction in relaxation techniques is  Managing Pain Before it Manages You  by Brianna Mai M.D., Ph.D., [employer].     \"Chronic Pain Control Workbook: A Step-by-Step Guide for Coping with and Overcoming Pain\" By Mary Ann Quezada and Katherine Zaragoza. [employer].     \"Chronic Pain Control Workbook: A step by step guide for coping with and overcoming pain\" by Mary Ann Quezada and Katherine Zaragoza. [employer]     SOILA Honeycutt (1987). Mastering Pain: A Twelve-Step Program for Coping with Chronic Pain. [employer].     The following online resources can provide additional helpful information about pain management generally.     www.ninds.nih.gov/health_and_medical/disorders/chronic_pain.htm      American Pain Society at www.ampainsoc.org      International Association for the Study of Pain at www.iasp-pain.org     American Academy of Pain Medicine at www.painmed.org    6. The results of the evaluation now constitute a baseline of the patient s cognitive and emotional functioning.  Given the evidence for mild but significant deficits, a referral for a neuropsychological reevaluation in approximately one year is recommended in order to clarify the differential diagnosis, document any changes in cognitive functioning, and provide further recommendations and prognosis to the patient, family and treatment team.    Results and recommendations were provided to the patient via telephone on 11/10/2020 and her questions were answered. Her  was not present for the results when I called.  I encouraged her to contact her new primary care physician, Dr. Torres, to help facilitate the recommendations. Thank you for referring this interesting individual. If you have any questions, please feel free to contact " me.      Weston Krishna, PhD, ABPP, LP  Professor and Licensed Psychologist [id number]  Board Certified Clinical Neuropsychologist  Phone: [phone]  Fax: [phone]    Time Spent:      Minutes Date Code Units   Total Time-Neuropsychologist Professional 216 11/10/20 52662 1     11/10/20 60254 3   Neuropsychologist Admin/scoring 0 11/10/20 29992 0     11/10/20 23800 0   Diagnostic Interview 26 11/10/20 30309 1   Psychometrician Time-Test Administration/Score 110 11/10/20 61490 1     11/10/20 29597 3   Diagnosis G31.84 R41.9 R52

## 2020-11-10 NOTE — PROGRESS NOTES
"Marilee Bell is a 75 year old female who is being evaluated via a billable video visit.      The patient has been notified of following:     \"This video visit will be conducted via a call between you and your physician/provider. We have found that certain health care needs can be provided without the need for an in-person physical exam.  This service lets us provide the care you need with a video conversation.  Video visits are billed at different rates depending on your insurance coverage.  Please reach out to your insurance provider with any questions.    If during the course of the call the physician/provider feels a video visit is not appropriate, you will not be charged for this service.\"    Patient has given verbal consent for Video visit? Yes    If you are dropped from the video visit, the video invite should be resent to: Send to e-mail at: [email or web address]  Will anyone else be joining your video visit? No      Video-Visit Details    Type of service:  Video Visit  Interview:  Video Start Time: 8:10 AM  Video End Time: 8:36 AM    Originating Location (pt. Location): Home    Distant Location (provider location):  [facility] NEUROPSYCHOLOGY [place]     Platform used for Video Visit: Zoom (NOTE: Beverly experienced technical difficulties so the video platform was switched to zoom.)    RE: Marilee Bell  MR#: [id number]  : 1945  DOS: Nov 10, 2020  WILLY:  November 10, 2020    Neuropsychology Laboratory  [address] [address]  [phone]    NEUROPSYCHOLOGICAL CONSULTATION      REASON FOR REFERRAL:  Marilee Bell is a 75 year old female with 16 years of education who was referred for a neuropsychological evaluation by Dr. Anabelle Torres to assess her cognitive and emotional functioning secondary to memory changes. The evaluation was requested in order to document her current level of functioning, assist with the differential " diagnosis and provide appropriate recommendations to the patient, family and treatment team. The patient was informed that the evaluation included multiple measures of performance and symptom validity, and she was encouraged to provide her best effort at all times.  The nature of the neuropsychological evaluation was also discussed, including limits of confidentiality (for suspected child or elder abuse, potential homicide or suicide, and court orders). The patient was also informed that the report would be placed on the electronic medical records system. The patient was also given an opportunity to ask questions. The patient indicated that she understood the information and consented to participate in the evaluation.    Due to circumstances that prevent in-person clinical visits, this assessment was conducted using telehealth methods (including remote audiovisual presentation of test instructions and test stimuli). The standard administration of these procedures involves in-person, face-to-face methods. The impact of applying non-standard administration methods has been evaluated only in part by scientific research. While every effort was made to simulate standard assessment practices, the diagnostic conclusions and recommendations for treatment provided in this report are being advanced with these reservations.    PROCEDURES:   Review of records and clinical interview  Mental Status-orientation, Wide Range Achievement Test-4, Wechsler Adult Intelligence Scale-IV, Guzman Verbal Learning Test-Revised, Clock Drawing Test, Verbal Fluency Test, Oral Trailmaking Test, BDAE Complex Ideational Material, Test of Sustained Attention and Tracking, Golden Naming Test (15 Item version), RBANS, Geriatric Depression Scale, Pepper Anxiety Inventory and ILS Health and Safety Questions    REVIEW OF RECORDS: Records from 9/11/2020 indicated the referring physician was concerned with memory changes for the patient. The records  indicated that the patient  lives with her  in their home. Her children and  have noticed that she is having memory changes for about a year. Apparently she forgets conversations, can't remember why she went into a different room, etc. All short term memory problems. Pt and  share the duties of cooking, doing finances, hskg. No problems. Pt sets up her meds and believes she takes them regularly. She does drive. However, she has voiced that she is more anxious when going to new places or during high traffic times. We discussed this and recommended that she avoids those situations and has her  or family drive her when this happens. Pt scored 24/30 on MOCA. She did not remember any of the recall words and could not name 11 or more words that begin with F. Pt does not feel there is an issue but dtr does want pt to pursue neuropsych testing.     Records from 10/28/2018 noted that she was seen in the emergency department for dizziness with associated nausea and vomiting.  The records indicated she was being treated with aspirin, vitamin D plus calcium, multivitamin, and simvastatin at that time and she denied significant medical history.  Neurosurgery records from 8/20/2020 noted diagnoses of Lumbar stenosis with neurogenic claudication, Stenosis of lateral recess of lumbar spine, Neuroforaminal stenosis of lumbar spine, Spondylolisthesis of lumbar region, Low back pain radiating to both legs, Lumbar disc herniation, and Facet arthropathy.    CLINICAL INTERVIEW: The patient was interviewed via video platform for this telehealth neuropsychological evaluation.  The patient's , Manjit, and daughter, Avril, were also present.  On interview, the patient reported difficulty with her short-term memory.  She noted that she has difficulty in recalling new information, and sometimes needs to write down the information to recall it later.  She denied difficulty with long-term memory.  Her   "confirmed this information, and noted that sometimes she does not recall information from the conversation.  The patient's daughter also confirmed concerns with the patient's memory, and indicated that they have observed the patient's memory problems over approximately the past year.  The patient's daughter also noted that the patient's memory difficulties have been getting gradually worse over time.  The patient also noted occasional word finding difficulties, and stated that sometimes it takes her longer to come up with a word than previously.  The patient and her  denied significant problems with decision-making or problem-solving for the patient.  In terms of attention/concentration, the patient stated that sometimes she has difficulty with concentration while reading, but when she is completing physical activities like chores she does not have any difficulty.  The patient's  confirmed this.  Functionally, the patient reported that she is driving, but has curtailed her driving.  Her  said she has not had any accidents or tickets, but she has become confused with directions to get home.  In terms of financial management, the patient reported that her  has always done this and this is not a change.  Her  stated that she helps and is able to participate in household financial decision making.  The patient denied difficulty with medication management or basic activities of daily living such as cooking, cleaning, and yard work.    The patient reported that her mood has \"lately been not so good\" due to Covid-19 restrictions.  She noted that engaging in activities like walking her dog helps her mood.  The patient's  indicated that he was not particularly concerned about her mood.  The patient also denied difficulty with anxiety.  However, the patient reported that her sleeping is poor due to significant back pain.  She said that she will sleep about 5 to 6 hours per night, and " "that her sleep that is interrupted by the pain.  Her  noted she also takes naps during the day.  The patient denied difficulty with appetite.  The patient also denied any previous contact with mental health professionals.  She denied any current or previous suicidal or homicidal ideation, and denied any history of suicide attempts.  The patient also denied any hallucinations or delusions.  The patient and her  noted that she will occasionally drink wine with dinner, but this is not a nightly occurrence.  The patient denied any use of tobacco or illicit substances.    Medically, the patient noted difficulty with her low back pain, which is consistent with the records.  She reported the pain is not constant, but when she has pain, it is between a 6 and 7 on a 1-10 scale, with 10 being the most severe.  She also noted that sometimes her pain gets as severe as an 8 on the scale, which usually occurs when she has been active.  The patient noted that she is taking medication for high cholesterol, but her physician has said she could stop this medication.  The patient denied any history of traumatic brain injury with loss of consciousness.  She also denied any history multiple sclerosis, strokes, seizures, movement disorders, sleep apnea, hypothyroidism, diabetes, heart disease, cancer, hypertension, liver disease, or kidney disease.  She stated that her mother  of a stroke and her father  with cardiac arrest.  The patient reported that she wears eyeglasses primarily for reading.  She denied any history of eye diseases or hearing problems.  They also noted that her medication list is currently up-to-date.    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  She stated that she was a \"pretty good\" student in school, and received primarily A's and B's.  The patient and her  noted they have been  for 54 years " and have 2 children, a son and a daughter.  The patient noted that she lives at home with her , and is retired.    BEHAVIORAL OBSERVATIONS: On examination, the patient was casually but appropriately dressed and groomed. The patient was cooperative with the evaluation and was talkative. The patient appeared to put forth her best effort on all tasks. Thus, the results of this evaluation are a reasonably valid reflection of the patient s current level of functioning. There were no indications of hallucinations, delusions or unusual thought processes. The patient was generally oriented and her affect was appropriate.  At times, the patient struggled with remembering details of her history during the interview.    RESULTS: Formal performance validity measures were within expected limits and consistent with the above-noted observations.  Psychometric estimates of the patient's premorbid intellectual functioning based upon single word reading ability were in the high average range, which is generally consistent with her educational and occupational history.  Likewise, verbally mediated intellectual functioning was in the upper half of the average range.  Thus, there was no evidence for significant change or decline in global cognitive functioning.    Measures of attention/concentration and processing speed were generally within expected limits.  For example, a digit span task was in the average range.  Likewise, an oral sequencing task that integrates attention was within expected limits.  Similarly, a timed measure of attention and processing speed was within expectancy.    Measures of the patient's verbal memory functioning indicated evidence for mild deficits.  Specifically, there was evidence for variability in encoding of new information, with the patient performing better on a more structured story memory task that was within expected limits.  In contrast, a less structured word list learning task indicated  evidence for mild inefficiency in encoding of new verbal information.  Further, there was evidence for mild but significant deficits with delayed recall, indicating evidence for mild deficits with retrieval of previously learned verbal information.  A recognition format was mildly helpful for the patient.  Visual memory functioning could not be assessed due to the telehealth platform for this evaluation.    Expressive language functioning was variable.  For example, vocabulary was in the upper half of the average range.  However, confrontation naming was at the lower end of expected limits.  Phonemic fluency was in the mildly impaired range as well.  Semantic fluency was better and within expected limits.  Receptive language functioning, as assessed by a complex ideational material task, was within expected limits and better preserved.    Visual-spatial and visual constructional abilities were generally within expected limits.  For example, motor-free visual reasoning was in the average range.  Further, motor-free line orientation judgment was within expected limits.  Additionally, a clock drawing task indicated no evidence for significant visual-spatial distortions.    Measures of the patient's executive functioning were generally within expected limits.  For example, verbal and visual reasoning was in the average range.  Further, a complex oral sequencing task that integrates cognitive flexibility was within expected limits.  A clock drawing task did not indicate evidence for significant planning and organizational difficulties.  While verbal fluency was mildly variable, a functional measure of basic health and safety awareness was within expected limits.    Assessment of the patient's emotional functioning was completed utilizing the clinical interview and self-report measures of depression and anxiety.  On the self-report measures, the patient denied clinically significant symptoms of depression and anxiety,  which is generally consistent with her report during the interview.    SUMMARY:  The following opinions and recommendations are based on the interview and formal testing data obtained via telephone, thus all results were interpreted with caution.  In summary, the neuropsychological assessment results indicated evidence from mild deficits primarily with retrieval of previously learned verbal information.  Encoding of new verbal information was variable, but the patient performed better on more structured verbal memory tasks.  A recognition format improved her performance, indicating no consistent evidence for rapid forgetting of previously learned verbal information.  Expressive language functioning was variable, while receptive language functioning, executive functioning, and visual-spatial abilities were within expected limits.  Attention and processing speed were also within expectancy, and there was no evidence for significant change or decline in global cognitive functioning.  The patient and her  also did not endorse significant limitations in her functional abilities.  Thus, the results indicated that the patient met criteria for a mild cognitive impairment (MCI).  The specific etiology of her cognitive difficulties could not be completely determined at this time.  However, factors such as pain and sleep difficulties are likely contributing.  Neurologically based cognitive difficulties could not be completely ruled out based on this evaluation alone.    RECOMMENDATIONS:   1.  Given these results, a referral for neurological consultation is recommended.  Further, referral for neuroimaging scans, such as an MRI scan of the brain, would be helpful in clarifying the differential diagnosis.  2. Given her difficulties with back pain, a referral for psychotherapeutic intervention is also recommended. A highly structured cognitive-behavioral intervention focused on coping and stress management would likely be  the most beneficial. Referral to a psychologist who specializes in chronic pain would be particularly helpful. One option for this service is through the [facility] at [email or web address] or [phone].  3. In order to promote learning and memorization of new verbal material, it is recommended that the patient add structure to the material she is learning to promote subsequent recall (e.g., use mnemonic devices, acronyms, make up a story or song). Additionally, she is encouraged to use visual aids, such as flash cards, diagrams, charts, etc.   4. The patient may find the following attention and organizational strategies helpful:     Use cell phone reminders to help monitor upcoming appointments and due dates as well as other important tasks (e.g., when to take medications). Set the reminder to go off several times prior to the event with advanced notice (e.g., one week before, two days before, the day before, and the morning of the event).     She should attempt to reduce distractions at home. Having a clutter-free work environment and removing or at least making it harder to access potential distractions would help optimize his attention. He might also consider facing away from high traffic areas and using earplugs or noise cancellation headphones when desiring a quiet work environment.    Taking short breaks during her day may help optimize and maintain her concentration.     Make to-do lists and prioritize tasks. Break down large tasks into smaller steps and check off each small step when completed.   5. Chronic pain can interfere with cognitive functioning in daily life. The following strategies can be helpful in minimizing the impact of pain on concentration and memory:    Get an adequate amount of restorative sleep each night     Practice mindfulness exercises often      Maintain a schedule that allows for sufficient time to engage in  "pleasurable activities.     A good self-help resource for managing pain and instruction in relaxation techniques is  Managing Pain Before it Manages You  by Brianna Mai M.D., Ph.D., [employer].     \"Chronic Pain Control Workbook: A Step-by-Step Guide for Coping with and Overcoming Pain\" By Mary Ann Quezada and Katherine Zaragoza. [employer].     \"Chronic Pain Control Workbook: A step by step guide for coping with and overcoming pain\" by Mary Ann Quezada and AvrilAltialenora Zaragoza. [employer]     SOILA Honeycutt (1987). Mastering Pain: A Twelve-Step Program for Coping with Chronic Pain. [employer].     The following online resources can provide additional helpful information about pain management generally.     www.ninds.nih.gov/health_and_medical/disorders/chronic_pain.htm      American Pain Society at www.ampainsoc.org      International Association for the Study of Pain at www.iasp-pain.org     American Academy of Pain Medicine at www.painmed.org    6. The results of the evaluation now constitute a baseline of the patient s cognitive and emotional functioning.  Given the evidence for mild but significant deficits, a referral for a neuropsychological reevaluation in approximately one year is recommended in order to clarify the differential diagnosis, document any changes in cognitive functioning, and provide further recommendations and prognosis to the patient, family and treatment team.    Results and recommendations were provided to the patient via telephone on 11/10/2020 and her questions were answered. Her  was not present for the results when I called.  I encouraged her to contact her new primary care physician, Dr. Torres, to help facilitate the recommendations. Thank you for referring this interesting individual. If you have any questions, please feel free to contact me.      Weston Krishna, PhD, ABPP, LP  Professor and Licensed Psychologist [id number]  Board " Certified Clinical Neuropsychologist  Phone: [phone]  Fax: [phone]    Time Spent:      Minutes Date Code Units   Total Time-Neuropsychologist Professional 216 11/10/20 28422 1     11/10/20 72254 3   Neuropsychologist Admin/scoring 0 11/10/20 04764 0     11/10/20 30323 0   Diagnostic Interview 26 11/10/20 32705 1   Psychometrician Time-Test Administration/Score 110 11/10/20 19099 1     11/10/20 84823 3   Diagnosis G31.84 R41.9 R52

## 2021-03-06 ENCOUNTER — HEALTH MAINTENANCE LETTER (OUTPATIENT)
Age: 76
End: 2021-03-06

## 2021-10-09 ENCOUNTER — HEALTH MAINTENANCE LETTER (OUTPATIENT)
Age: 76
End: 2021-10-09

## 2022-03-26 ENCOUNTER — HEALTH MAINTENANCE LETTER (OUTPATIENT)
Age: 77
End: 2022-03-26

## 2022-09-17 ENCOUNTER — HEALTH MAINTENANCE LETTER (OUTPATIENT)
Age: 77
End: 2022-09-17

## 2023-05-06 ENCOUNTER — HEALTH MAINTENANCE LETTER (OUTPATIENT)
Age: 78
End: 2023-05-06

## 2023-05-29 ENCOUNTER — APPOINTMENT (OUTPATIENT)
Dept: MRI IMAGING | Facility: CLINIC | Age: 78
End: 2023-05-29
Attending: EMERGENCY MEDICINE
Payer: COMMERCIAL

## 2023-05-29 ENCOUNTER — HOSPITAL ENCOUNTER (EMERGENCY)
Facility: CLINIC | Age: 78
Discharge: HOME | End: 2023-05-29
Attending: EMERGENCY MEDICINE | Admitting: EMERGENCY MEDICINE
Payer: COMMERCIAL

## 2023-05-29 VITALS
TEMPERATURE: 97.7 F | BODY MASS INDEX: 27.02 KG/M2 | OXYGEN SATURATION: 97 % | HEART RATE: 48 BPM | SYSTOLIC BLOOD PRESSURE: 118 MMHG | WEIGHT: 143 LBS | RESPIRATION RATE: 20 BRPM | DIASTOLIC BLOOD PRESSURE: 60 MMHG

## 2023-05-29 DIAGNOSIS — R42 VERTIGO: ICD-10-CM

## 2023-05-29 LAB
ALBUMIN SERPL BCG-MCNC: 4.4 G/DL (ref 3.5–5.2)
ALBUMIN UR-MCNC: NEGATIVE MG/DL
ALP SERPL-CCNC: 91 U/L (ref 35–104)
ALT SERPL W P-5'-P-CCNC: 24 U/L (ref 10–35)
ANION GAP SERPL CALCULATED.3IONS-SCNC: 15 MMOL/L (ref 7–15)
ANION GAP SERPL CALCULATED.3IONS-SCNC: 15 MMOL/L (ref 7–15)
APPEARANCE UR: CLEAR
AST SERPL W P-5'-P-CCNC: 28 U/L (ref 10–35)
BASOPHILS # BLD AUTO: 0 10E3/UL (ref 0–0.2)
BASOPHILS NFR BLD AUTO: 0 %
BILIRUB SERPL-MCNC: 0.8 MG/DL
BILIRUB UR QL STRIP: NEGATIVE
BUN SERPL-MCNC: 21.6 MG/DL (ref 8–23)
BUN SERPL-MCNC: 21.6 MG/DL (ref 8–23)
CALCIUM SERPL-MCNC: 9.6 MG/DL (ref 8.8–10.2)
CALCIUM SERPL-MCNC: 9.6 MG/DL (ref 8.8–10.2)
CHLORIDE SERPL-SCNC: 103 MMOL/L (ref 98–107)
CHLORIDE SERPL-SCNC: 103 MMOL/L (ref 98–107)
COLOR UR AUTO: YELLOW
CREAT SERPL-MCNC: 0.61 MG/DL (ref 0.51–0.95)
CREAT SERPL-MCNC: 0.61 MG/DL (ref 0.51–0.95)
DEPRECATED HCO3 PLAS-SCNC: 22 MMOL/L (ref 22–29)
DEPRECATED HCO3 PLAS-SCNC: 22 MMOL/L (ref 22–29)
EOSINOPHIL # BLD AUTO: 0 10E3/UL (ref 0–0.7)
EOSINOPHIL NFR BLD AUTO: 0 %
ERYTHROCYTE [DISTWIDTH] IN BLOOD BY AUTOMATED COUNT: 12.6 % (ref 10–15)
GFR SERPL CREATININE-BSD FRML MDRD: 90 ML/MIN/1.73M2
GFR SERPL CREATININE-BSD FRML MDRD: 90 ML/MIN/1.73M2
GLUCOSE SERPL-MCNC: 110 MG/DL (ref 70–99)
GLUCOSE SERPL-MCNC: 110 MG/DL (ref 70–99)
GLUCOSE UR STRIP-MCNC: NEGATIVE MG/DL
HCT VFR BLD AUTO: 44.2 % (ref 35–47)
HGB BLD-MCNC: 14.9 G/DL (ref 11.7–15.7)
HGB UR QL STRIP: NEGATIVE
HOLD SPECIMEN: NORMAL
HOLD SPECIMEN: NORMAL
IMM GRANULOCYTES # BLD: 0 10E3/UL
IMM GRANULOCYTES NFR BLD: 0 %
KETONES UR STRIP-MCNC: 40 MG/DL
LEUKOCYTE ESTERASE UR QL STRIP: ABNORMAL
LYMPHOCYTES # BLD AUTO: 1.4 10E3/UL (ref 0.8–5.3)
LYMPHOCYTES NFR BLD AUTO: 14 %
MCH RBC QN AUTO: 31.8 PG (ref 26.5–33)
MCHC RBC AUTO-ENTMCNC: 33.7 G/DL (ref 31.5–36.5)
MCV RBC AUTO: 94 FL (ref 78–100)
MONOCYTES # BLD AUTO: 0.5 10E3/UL (ref 0–1.3)
MONOCYTES NFR BLD AUTO: 5 %
MUCOUS THREADS #/AREA URNS LPF: PRESENT /LPF
NEUTROPHILS # BLD AUTO: 7.8 10E3/UL (ref 1.6–8.3)
NEUTROPHILS NFR BLD AUTO: 81 %
NITRATE UR QL: NEGATIVE
NRBC # BLD AUTO: 0 10E3/UL
NRBC BLD AUTO-RTO: 0 /100
PH UR STRIP: 5.5 [PH] (ref 5–7)
PLATELET # BLD AUTO: 283 10E3/UL (ref 150–450)
POTASSIUM SERPL-SCNC: 3.8 MMOL/L (ref 3.4–5.3)
POTASSIUM SERPL-SCNC: 3.8 MMOL/L (ref 3.4–5.3)
PROT SERPL-MCNC: 7.1 G/DL (ref 6.4–8.3)
RBC # BLD AUTO: 4.68 10E6/UL (ref 3.8–5.2)
RBC URINE: 5 /HPF
RENAL TUB EPI: 1 /HPF
SODIUM SERPL-SCNC: 140 MMOL/L (ref 136–145)
SODIUM SERPL-SCNC: 140 MMOL/L (ref 136–145)
SP GR UR STRIP: 1.02 (ref 1–1.03)
SQUAMOUS EPITHELIAL: 3 /HPF
TROPONIN T SERPL HS-MCNC: 7 NG/L
TSH SERPL DL<=0.005 MIU/L-ACNC: 2.54 UIU/ML (ref 0.3–4.2)
UROBILINOGEN UR STRIP-MCNC: NORMAL MG/DL
WBC # BLD AUTO: 9.7 10E3/UL (ref 4–11)
WBC URINE: 42 /HPF

## 2023-05-29 PROCEDURE — 96361 HYDRATE IV INFUSION ADD-ON: CPT

## 2023-05-29 PROCEDURE — A9585 GADOBUTROL INJECTION: HCPCS | Performed by: EMERGENCY MEDICINE

## 2023-05-29 PROCEDURE — 84443 ASSAY THYROID STIM HORMONE: CPT | Performed by: EMERGENCY MEDICINE

## 2023-05-29 PROCEDURE — 93005 ELECTROCARDIOGRAM TRACING: CPT

## 2023-05-29 PROCEDURE — 81001 URINALYSIS AUTO W/SCOPE: CPT | Performed by: EMERGENCY MEDICINE

## 2023-05-29 PROCEDURE — 96374 THER/PROPH/DIAG INJ IV PUSH: CPT | Mod: 59

## 2023-05-29 PROCEDURE — 82310 ASSAY OF CALCIUM: CPT | Performed by: EMERGENCY MEDICINE

## 2023-05-29 PROCEDURE — 250N000013 HC RX MED GY IP 250 OP 250 PS 637: Performed by: EMERGENCY MEDICINE

## 2023-05-29 PROCEDURE — 84484 ASSAY OF TROPONIN QUANT: CPT | Performed by: EMERGENCY MEDICINE

## 2023-05-29 PROCEDURE — 70553 MRI BRAIN STEM W/O & W/DYE: CPT

## 2023-05-29 PROCEDURE — 258N000003 HC RX IP 258 OP 636: Performed by: EMERGENCY MEDICINE

## 2023-05-29 PROCEDURE — 99285 EMERGENCY DEPT VISIT HI MDM: CPT | Mod: 25

## 2023-05-29 PROCEDURE — 255N000002 HC RX 255 OP 636: Performed by: EMERGENCY MEDICINE

## 2023-05-29 PROCEDURE — 36415 COLL VENOUS BLD VENIPUNCTURE: CPT | Performed by: EMERGENCY MEDICINE

## 2023-05-29 PROCEDURE — 87086 URINE CULTURE/COLONY COUNT: CPT | Performed by: EMERGENCY MEDICINE

## 2023-05-29 PROCEDURE — 250N000011 HC RX IP 250 OP 636: Performed by: EMERGENCY MEDICINE

## 2023-05-29 PROCEDURE — 85025 COMPLETE CBC W/AUTO DIFF WBC: CPT | Performed by: EMERGENCY MEDICINE

## 2023-05-29 RX ORDER — GADOBUTROL 604.72 MG/ML
6.5 INJECTION INTRAVENOUS ONCE
Status: COMPLETED | OUTPATIENT
Start: 2023-05-29 | End: 2023-05-29

## 2023-05-29 RX ORDER — CEPHALEXIN 500 MG/1
500 CAPSULE ORAL 2 TIMES DAILY
Qty: 10 CAPSULE | Refills: 0 | Status: SHIPPED | OUTPATIENT
Start: 2023-05-29 | End: 2023-06-03

## 2023-05-29 RX ORDER — ONDANSETRON 2 MG/ML
4 INJECTION INTRAMUSCULAR; INTRAVENOUS EVERY 30 MIN PRN
Status: DISCONTINUED | OUTPATIENT
Start: 2023-05-29 | End: 2023-05-29 | Stop reason: HOSPADM

## 2023-05-29 RX ORDER — MECLIZINE HCL 12.5 MG 12.5 MG/1
12.5 TABLET ORAL 4 TIMES DAILY PRN
Qty: 30 TABLET | Refills: 0 | Status: SHIPPED | OUTPATIENT
Start: 2023-05-29

## 2023-05-29 RX ORDER — MECLIZINE HYDROCHLORIDE 25 MG/1
25 TABLET ORAL ONCE
Status: COMPLETED | OUTPATIENT
Start: 2023-05-29 | End: 2023-05-29

## 2023-05-29 RX ORDER — ONDANSETRON 4 MG/1
4 TABLET, ORALLY DISINTEGRATING ORAL EVERY 6 HOURS PRN
Qty: 10 TABLET | Refills: 0 | Status: SHIPPED | OUTPATIENT
Start: 2023-05-29 | End: 2023-06-01

## 2023-05-29 RX ADMIN — GADOBUTROL 6.5 ML: 604.72 INJECTION INTRAVENOUS at 11:24

## 2023-05-29 RX ADMIN — MECLIZINE HYDROCHLORIDE 25 MG: 25 TABLET ORAL at 09:04

## 2023-05-29 RX ADMIN — ONDANSETRON 4 MG: 2 INJECTION INTRAMUSCULAR; INTRAVENOUS at 08:05

## 2023-05-29 RX ADMIN — SODIUM CHLORIDE 1000 ML: 9 INJECTION, SOLUTION INTRAVENOUS at 08:05

## 2023-05-29 ASSESSMENT — ACTIVITIES OF DAILY LIVING (ADL)
ADLS_ACUITY_SCORE: 35

## 2023-05-29 NOTE — ED TRIAGE NOTES
Arrives with complaints of dizziness and N/V that started at an unknown time yesterday morning, denies chest pain, complains of ocassional head pain but none at this time,  states she has been lethargic. Answering questions in triage but keeping eyes closed, oriented, ABCs intact.     Triage Assessment     Row Name 05/29/23 0741       Triage Assessment (Adult)    Airway WDL WDL       Respiratory WDL    Respiratory WDL WDL       Skin Circulation/Temperature WDL    Skin Circulation/Temperature WDL WDL       Cardiac WDL    Cardiac WDL WDL       Peripheral/Neurovascular WDL    Peripheral Neurovascular WDL WDL       Cognitive/Neuro/Behavioral WDL    Cognitive/Neuro/Behavioral WDL WDL

## 2023-05-29 NOTE — DISCHARGE INSTRUCTIONS
Zofran for nausea as needed  Meclizine for dizziness  Rest and push fluids  Return if feeling worse

## 2023-05-29 NOTE — ED PROVIDER NOTES
History     Chief Complaint:  Nausea & Vomiting and Dizziness     The history is provided by the patient and the spouse.      Marilee Bell is a 78 year old female with a history of hyperlipidemia who presents to the ED via private vehicle for evaluation of room-spinning dizziness and vomiting, accompanied with nausea that onset 2 days ago. Adds that she unable to ambulate due to her dizziness and that she has had nothing to eat for the past 2 days. Also adds that laying down feels more comfortable. The  reports that she has been vomiting a lot and has been having diaphoresis, but he denies anyone being sick around them. Patient denies any fever, chest pain, shortness of breath, cough, and diarrhea.    Independent Historian:   Spouse/Partner - They report as noted above    Review of External Notes:   None     Medications:    Aspirin (81 mg)  Simvastatin    Past Medical History:    Anesthesia complication  Kidney calculus  MCI  Nephrolithiasis  Spinal stenosis of lumbar region with neurogenic claudication  Tinnitus  Basal cell carcinoma  Osteopenia  Osteoarthritis  Rosacea  Hyperlipidemia  Colonic polyps    Past Surgical History:    Kidney stone surgery  Carpal tunnel release  Lithotripsy of ureter, stent insert left    Physical Exam     Patient Vitals for the past 24 hrs:   BP Temp Temp src Pulse Resp SpO2 Weight   05/29/23 1328 118/60 -- -- (!) 48 20 97 % --   05/29/23 1100 114/53 -- -- (!) 42 16 94 % --   05/29/23 1000 118/56 -- -- (!) 43 13 92 % --   05/29/23 0800 132/71 -- -- (!) 48 16 94 % 64.9 kg (143 lb)   05/29/23 0740 (!) 142/81 97.7  F (36.5  C) Oral 53 20 95 % --      Physical Exam  Constitutional:       Appearance: She is well-developed.   HENT:      Right Ear: External ear normal.      Left Ear: External ear normal.      Mouth/Throat:      Mouth: Mucous membranes are moist.      Pharynx: Oropharynx is clear. No oropharyngeal exudate.   Eyes:      General: No scleral icterus.      Conjunctiva/sclera: Conjunctivae normal.      Pupils: Pupils are equal, round, and reactive to light.   Cardiovascular:      Rate and Rhythm: Normal rate and regular rhythm.      Heart sounds: Normal heart sounds. No murmur heard.     No friction rub. No gallop.   Pulmonary:      Effort: Pulmonary effort is normal. No respiratory distress.      Breath sounds: Normal breath sounds. No wheezing or rales.   Abdominal:      General: Bowel sounds are normal. There is no distension.      Palpations: Abdomen is soft. There is no mass.      Tenderness: There is no abdominal tenderness.   Musculoskeletal:         General: Normal range of motion.   Skin:     General: Skin is warm and dry.      Capillary Refill: Capillary refill takes less than 2 seconds.      Findings: No rash.   Neurological:      General: No focal deficit present.      Mental Status: She is alert and oriented to person, place, and time.      Cranial Nerves: No cranial nerve deficit.      Sensory: No sensory deficit.      Motor: No weakness.           Emergency Department Course   ECG  ECG taken at 0751, ECG read at 0753  Sinus bradycardia with premature atrial complexes  Left anterior fascicular block  Possible anterolateral infarct, age undetermined   Rate 48 bpm. AR interval 156 ms. QRS duration 92 ms. QT/QTc 474/423 ms. P-R-T axes 50 -56 47.     Imaging:  MR Brain w/o & w Contrast   Final Result   IMPRESSION:   1.  No acute intracranial process.   2.  Generalized brain atrophy and presumed microvascular ischemic changes as detailed above.         Report per radiology    Laboratory:  Labs Ordered and Resulted from Time of ED Arrival to Time of ED Departure   BASIC METABOLIC PANEL - Abnormal       Result Value    Sodium 140      Potassium 3.8      Chloride 103      Carbon Dioxide (CO2) 22      Anion Gap 15      Urea Nitrogen 21.6      Creatinine 0.61      Calcium 9.6      Glucose 110 (*)     GFR Estimate 90     COMPREHENSIVE METABOLIC PANEL - Abnormal     Sodium 140      Potassium 3.8      Chloride 103      Carbon Dioxide (CO2) 22      Anion Gap 15      Urea Nitrogen 21.6      Creatinine 0.61      Calcium 9.6      Glucose 110 (*)     Alkaline Phosphatase 91      AST 28      ALT 24      Protein Total 7.1      Albumin 4.4      Bilirubin Total 0.8      GFR Estimate 90     ROUTINE UA WITH MICROSCOPIC REFLEX TO CULTURE - Abnormal    Color Urine Yellow      Appearance Urine Clear      Glucose Urine Negative      Bilirubin Urine Negative      Ketones Urine 40 (*)     Specific Gravity Urine 1.023      Blood Urine Negative      pH Urine 5.5      Protein Albumin Urine Negative      Urobilinogen Urine Normal      Nitrite Urine Negative      Leukocyte Esterase Urine Moderate (*)     Mucus Urine Present (*)     RBC Urine 5 (*)     WBC Urine 42 (*)     Squamous Epithelials Urine 3 (*)     Renal Tubular Epithelials Urine 1 (*)    TROPONIN T, HIGH SENSITIVITY - Normal    Troponin T, High Sensitivity 7     TSH WITH FREE T4 REFLEX - Normal    TSH 2.54     CBC WITH PLATELETS AND DIFFERENTIAL    WBC Count 9.7      RBC Count 4.68      Hemoglobin 14.9      Hematocrit 44.2      MCV 94      MCH 31.8      MCHC 33.7      RDW 12.6      Platelet Count 283      % Neutrophils 81      % Lymphocytes 14      % Monocytes 5      % Eosinophils 0      % Basophils 0      % Immature Granulocytes 0      NRBCs per 100 WBC 0      Absolute Neutrophils 7.8      Absolute Lymphocytes 1.4      Absolute Monocytes 0.5      Absolute Eosinophils 0.0      Absolute Basophils 0.0      Absolute Immature Granulocytes 0.0      Absolute NRBCs 0.0     URINE CULTURE     Emergency Department Course & Assessments:  Interventions:  Medications   ondansetron (ZOFRAN) injection 4 mg (4 mg Intravenous $Given 5/29/23 0805)   0.9% sodium chloride BOLUS (0 mLs Intravenous Stopped 5/29/23 1326)   gadobutrol (GADAVIST) injection 6.5 mL (6.5 mLs Intravenous $Given 5/29/23 1124)   meclizine (ANTIVERT) tablet 25 mg (25 mg Oral $Given  5/29/23 0904)      Assessments:  0753 I obtained history and examined the patient as noted above.  1221 I rechecked and updated the patient.    Independent Interpretation (X-rays, CTs, rhythm strip):  none    Consultations/Discussion of Management or Tests:  None       Social Determinants of Health affecting care:   None    Disposition:  The patient was discharged to home.     Impression & Plan    Medical Decision Making:  Patient presents today for evaluation of dizziness described as spinning sensation with some nausea.  Her neurologic exam is normal however she is quite symptomatic on exam initially.  Given her symptoms and age, I did do an MRI to rule out cerebellar stroke.  Thankfully her symptoms improved with medication here.  MRI was negative for acute stroke.  She passed a p.o. challenge and ambulation trial here.  Urine did show signs UTI so she will be prescribed a course of Keflex.  She is sent home with Zofran and meclizine as well and asked to push fluids and rest.  Return precautions provided.      Diagnosis:    ICD-10-CM    1. Vertigo  R42            Discharge Medications:  Discharge Medication List as of 5/29/2023  1:26 PM      START taking these medications    Details   meclizine (ANTIVERT) 12.5 MG tablet Take 1 tablet (12.5 mg) by mouth 4 times daily as needed for dizziness, Disp-30 tablet, R-0, E-Prescribe      ondansetron (ZOFRAN ODT) 4 MG ODT tab Take 1 tablet (4 mg) by mouth every 6 hours as needed for nausea, Disp-10 tablet, R-0, E-Prescribe                Scribe Disclosure:  I, JORGE TREVIÑO, am serving as a scribe at 2:13 PM on 5/29/2023 to document services personally performed by Chas Louise MD based on my observations and the provider's statements to me.   5/29/2023   Chas Neal MD Cheng, Wenlan, MD  05/29/23 5336

## 2023-05-30 LAB
ATRIAL RATE - MUSE: 48 BPM
DIASTOLIC BLOOD PRESSURE - MUSE: NORMAL MMHG
INTERPRETATION ECG - MUSE: NORMAL
P AXIS - MUSE: 50 DEGREES
PR INTERVAL - MUSE: 156 MS
QRS DURATION - MUSE: 92 MS
QT - MUSE: 474 MS
QTC - MUSE: 423 MS
R AXIS - MUSE: -56 DEGREES
SYSTOLIC BLOOD PRESSURE - MUSE: NORMAL MMHG
T AXIS - MUSE: 47 DEGREES
VENTRICULAR RATE- MUSE: 48 BPM

## 2023-05-31 LAB — BACTERIA UR CULT: NORMAL

## 2024-07-13 ENCOUNTER — HEALTH MAINTENANCE LETTER (OUTPATIENT)
Age: 79
End: 2024-07-13

## 2025-07-19 ENCOUNTER — HEALTH MAINTENANCE LETTER (OUTPATIENT)
Age: 80
End: 2025-07-19

## (undated) DEVICE — LASER FIBER HOLMIUM 365UM HTB-365

## (undated) DEVICE — GLOVE PROTEXIS W/NEU-THERA 6.5  2D73TE65

## (undated) DEVICE — SOL WATER IRRIG 1000ML BOTTLE 2F7114

## (undated) DEVICE — CATH URETERAL OPEN END 6FR AXXCESS

## (undated) DEVICE — TUBING IRRIG TUR Y TYPE 96" LF 6543-01

## (undated) DEVICE — PACK CYSTO CUSTOM RIDGES

## (undated) DEVICE — GUIDEWIRE SENSOR DUAL FLEX STR 0.035"X150CM M0066703080

## (undated) DEVICE — BASKET RETRIEVAL 1.9FRX120CM ESCAPE NTNL 4 WIRE 390-201

## (undated) DEVICE — BAG CLEAR TRASH 1.3M 39X33" P4040C

## (undated) DEVICE — LINEN TOWEL PACK X5 5464

## (undated) DEVICE — PAD CHUX UNDERPAD 30X36" P3036C

## (undated) DEVICE — LINEN HALF SHEET 5512

## (undated) DEVICE — GLOVE PROTEXIS W/NEU-THERA 8.0  2D73TE80

## (undated) DEVICE — SUCTION MANIFOLD NEPTUNE 2 SYS 4 PORT 0702-020-000

## (undated) DEVICE — COVER FOOTSWITCH W/CINCH 20X24" 923267

## (undated) DEVICE — TUBING SUCTION 12"X1/4" N612

## (undated) DEVICE — LINEN FULL SHEET 5511

## (undated) DEVICE — PREP TECHNI-CARE CHLOROXYLENOL 3% 4OZ BOTTLE C222-4ZWO

## (undated) DEVICE — GLOVE PROTEXIS BLUE W/NEU-THERA 7.0  2D73EB70

## (undated) DEVICE — DRAPE UNDER BUTTOCK 89415

## (undated) DEVICE — SOL WATER IRRIG 3000ML BAG 2B7117

## (undated) RX ORDER — LIDOCAINE HYDROCHLORIDE 10 MG/ML
INJECTION, SOLUTION EPIDURAL; INFILTRATION; INTRACAUDAL; PERINEURAL
Status: DISPENSED
Start: 2017-10-01

## (undated) RX ORDER — ONDANSETRON 2 MG/ML
INJECTION INTRAMUSCULAR; INTRAVENOUS
Status: DISPENSED
Start: 2017-10-01

## (undated) RX ORDER — ETOMIDATE 2 MG/ML
INJECTION INTRAVENOUS
Status: DISPENSED
Start: 2017-10-01

## (undated) RX ORDER — DEXAMETHASONE SODIUM PHOSPHATE 4 MG/ML
INJECTION, SOLUTION INTRA-ARTICULAR; INTRALESIONAL; INTRAMUSCULAR; INTRAVENOUS; SOFT TISSUE
Status: DISPENSED
Start: 2017-10-01

## (undated) RX ORDER — PROPOFOL 10 MG/ML
INJECTION, EMULSION INTRAVENOUS
Status: DISPENSED
Start: 2017-10-01

## (undated) RX ORDER — EPHEDRINE SULFATE 50 MG/ML
INJECTION, SOLUTION INTRAMUSCULAR; INTRAVENOUS; SUBCUTANEOUS
Status: DISPENSED
Start: 2017-10-01

## (undated) RX ORDER — PHENYLEPHRINE HCL IN 0.9% NACL 1 MG/10 ML
SYRINGE (ML) INTRAVENOUS
Status: DISPENSED
Start: 2017-10-01

## (undated) RX ORDER — GLYCOPYRROLATE 0.2 MG/ML
INJECTION INTRAMUSCULAR; INTRAVENOUS
Status: DISPENSED
Start: 2017-10-01